# Patient Record
Sex: MALE | Race: BLACK OR AFRICAN AMERICAN | Employment: FULL TIME | URBAN - METROPOLITAN AREA
[De-identification: names, ages, dates, MRNs, and addresses within clinical notes are randomized per-mention and may not be internally consistent; named-entity substitution may affect disease eponyms.]

---

## 2021-10-19 ENCOUNTER — OFFICE VISIT (OUTPATIENT)
Dept: CARDIOLOGY CLINIC | Age: 61
End: 2021-10-19
Payer: COMMERCIAL

## 2021-10-19 VITALS
HEART RATE: 75 BPM | DIASTOLIC BLOOD PRESSURE: 80 MMHG | OXYGEN SATURATION: 96 % | SYSTOLIC BLOOD PRESSURE: 134 MMHG | BODY MASS INDEX: 33.79 KG/M2 | HEIGHT: 70 IN | WEIGHT: 236 LBS

## 2021-10-19 DIAGNOSIS — R07.9 CHEST PAIN, UNSPECIFIED TYPE: Primary | ICD-10-CM

## 2021-10-19 PROCEDURE — 93000 ELECTROCARDIOGRAM COMPLETE: CPT | Performed by: INTERNAL MEDICINE

## 2021-10-19 PROCEDURE — 99204 OFFICE O/P NEW MOD 45 MIN: CPT | Performed by: INTERNAL MEDICINE

## 2021-10-19 RX ORDER — ASPIRIN 81 MG/1
81 TABLET ORAL DAILY
Qty: 30 TABLET | Refills: 5 | Status: SHIPPED | OUTPATIENT
Start: 2021-10-19 | End: 2022-02-28

## 2021-10-19 RX ORDER — HYDROCODONE BITARTRATE AND ACETAMINOPHEN 7.5; 325 MG/1; MG/1
1 TABLET ORAL
COMMUNITY
End: 2022-02-28

## 2021-10-19 RX ORDER — TAMSULOSIN HYDROCHLORIDE 0.4 MG/1
0.4 CAPSULE ORAL DAILY
COMMUNITY
End: 2022-02-28

## 2021-10-19 RX ORDER — FINASTERIDE 5 MG/1
5 TABLET, FILM COATED ORAL DAILY
COMMUNITY
End: 2022-02-21 | Stop reason: SDUPTHER

## 2021-10-19 RX ORDER — SALMETEROL XINAFOATE 50 UG/1
1 POWDER, METERED ORAL; RESPIRATORY (INHALATION)
COMMUNITY
End: 2022-02-28

## 2021-10-19 RX ORDER — ATORVASTATIN CALCIUM 40 MG/1
40 TABLET, FILM COATED ORAL DAILY
COMMUNITY

## 2021-10-19 RX ORDER — MAXZIDE 75; 50 MG/1; MG/1
1 TABLET ORAL DAILY
COMMUNITY
End: 2022-02-28

## 2021-10-19 NOTE — PROGRESS NOTES
HISTORY OF PRESENT ILLNESS  Alexey Teixeira is a 62 y.o. male. ASSESSMENT and PLAN    Mr. Daisy Valentine has no prior documented history of cardiac disease. He does have history of noninsulin-dependent diabetes mellitus, hyperlipidemia, hypertension and obesity. He has no known prior family history of early CAD. He did smoke cigarettes until 1999 but discontinued. He struggles with back pain because of a fall that he abstained while he was in the [employer]. Interestingly, he has been diagnosed with histoplasmosis around 2010. He has issues with left-sided lung parenchymal deficiency. His chest pain is somewhat atypical.  He was thought to have had pericarditis and was started on indomethacin. There is no exertional component. However, his electrocardiogram is abnormal with anterolateral T wave inversion. His blood pressure is well controlled. Rhythm appears to be sinus. There is no evidence of decompensated CHF noted. His weight today is 236 pounds. 4 months ago, his weight was around 225 pounds. He is on Lipitor 40 mg daily. He has not smoked cigarettes since 1999. He is started on baby aspirin daily. For his chest pain, with his abnormal electrocardiogram, I have recommended proceeding with pharmacologic nuclear scan. He is unable to exercise due to back pain. Also, echocardiogram has been requested. If the above testing shows significant abnormalities, I did discuss with him at length about possibility of coronary angiography. He is in agreement. He is scheduled to have stimulator for his back pain. This is scheduled for 11/2/2021. I will see him back in 1 month. Thank you. Encounter Diagnoses   Name Primary?     Chest pain, unspecified type Yes     the following changes in treatment are made: See above  lab results and schedule of future lab studies reviewed with patient  reviewed diet, exercise and weight control  very strongly urged to quit smoking to reduce cardiovascular risk  cardiovascular risk and specific lipid/LDL goals reviewed  use of aspirin to prevent MI and TIA's discussed      HPI   Today, Mr. Cara Nicolas has complaints of mid substernal pains. There is no radiation. There is no associated dyspnea. There is no exertional component. He denies any orthopnea or PND. He does have baseline dyspnea on exertion without change. He denies any palpitations or dizziness. Review of Systems   Respiratory: Positive for shortness of breath. Cardiovascular: Positive for chest pain. Negative for palpitations, orthopnea, claudication, leg swelling and PND. Musculoskeletal: Positive for back pain. All other systems reviewed and are negative. Physical Exam  Vitals and nursing note reviewed. Constitutional:       Appearance: He is obese. HENT:      Head: Normocephalic. Eyes:      Conjunctiva/sclera: Conjunctivae normal.   Neck:      Vascular: No carotid bruit. Cardiovascular:      Rate and Rhythm: Normal rate and regular rhythm. Pulmonary:      Breath sounds: Normal breath sounds. Abdominal:      Palpations: Abdomen is soft. Musculoskeletal:         General: No swelling. Cervical back: No rigidity. Skin:     General: Skin is warm and dry. Neurological:      General: No focal deficit present. Mental Status: He is alert and oriented to person, place, and time. Psychiatric:         Mood and Affect: Mood normal.         Behavior: Behavior normal.         PCP: Unknown, Provider, MD    Past Medical History:   Diagnosis Date    Diabetes ([facility] [address]     Essential hypertension     Histoplasmosis 2010       No past surgical history on file. Current Outpatient Medications   Medication Sig Dispense Refill    triamterene-hydroCHLOROthiazide (Maxzide) 75-50 mg per tablet Take 1 Tablet by mouth daily.  tiotropium (SPIRIVA) 18 mcg inhalation capsule Take 1 Capsule by inhalation.       SITagliptin (Januvia) 100 mg tablet       tamsulosin (Flomax) 0.4 mg capsule Take 0.4 mg by mouth daily.  salmeteroL (Serevent Diskus) 50 mcg/dose dsdv Take 1 Puff by inhalation.  PROPYLHEXEDRINE NA by Nasal route.  HYDROcodone-acetaminophen (Norco) 7.5-325 mg per tablet Take 1 Tablet by mouth.  atorvastatin (LIPITOR) 40 mg tablet Take 40 mg by mouth daily.  finasteride (Proscar) 5 mg tablet Take 5 mg by mouth daily.  glipiZIDE (GLUCOTROL) 10 mg tablet Take  by mouth.  amLODIPine (Norvasc) 10 mg tablet Take 10 mg by mouth daily.  atenoloL (TENORMIN) 50 mg tablet Take 50 mg by mouth daily.  celecoxib (CeleBREX) 400 mg capsule Take 400 mg by mouth.  metFORMIN (GLUCOPHAGE) 500 mg tablet Take 500 mg by mouth two (2) times daily (with meals).  sildenafil citrate (VIAGRA) 100 mg tablet Take 1 Tablet by mouth daily as needed for Erectile Dysfunction. 30 Tablet 3       The patient has a family history of    Social History     Tobacco Use    Smoking status: Never Smoker    Smokeless tobacco: Never Used   Vaping Use    Vaping Use: Never used   Substance Use Topics    Alcohol use: Not Currently    Drug use: Never       No results found for: CHOL, CHOLX, CHLST, CHOLV, HDL, HDLP, LDL, LDLC, DLDLP, TGLX, TRIGL, TRIGP, CHHD, CHHDX     BP Readings from Last 3 Encounters:   10/19/21 134/80        Pulse Readings from Last 3 Encounters:   10/19/21 75       Wt Readings from Last 3 Encounters:   10/19/21 107 kg (236 lb)   08/30/21 99.8 kg (220 lb)         EKG: normal sinus rhythm, lateral T wave inversion in leads I, 2, aVL, V4-V6.

## 2021-10-19 NOTE — PROGRESS NOTES
Randi Guzman presents today for   Chief Complaint   Patient presents with    New Patient     referred by [facility] for chest pain        Randi Guzman preferred language for health care discussion is english/other. Is someone accompanying this pt? no    Is the patient using any DME equipment during [address]? no    Depression Screening:  3 most recent PHQ Screens 10/19/2021   Little interest or pleasure in doing things Not at all   Feeling down, depressed, irritable, or hopeless Not at all   Total Score PHQ 2 0       Learning Assessment:  Learning Assessment 10/19/2021   PRIMARY LEARNER Patient   HIGHEST LEVEL OF EDUCATION - PRIMARY LEARNER  4 YEARS OF COLLEGE   BARRIERS PRIMARY LEARNER NONE   PRIMARY LANGUAGE ENGLISH   LEARNER PREFERENCE PRIMARY DEMONSTRATION   ANSWERED BY patient   RELATIONSHIP SELF       Abuse Screening:  Abuse Screening Questionnaire 10/19/2021   Do you ever feel afraid of your partner? N   Are you in a relationship with someone who physically or mentally threatens you? N   Is it safe for you to go home? Y       Fall Risk  No flowsheet data found. Pt currently taking Anticoagulant therapy? no    Coordination of Care:  1. Have you been to the ER, urgent care clinic since your last visit? Hospitalized since your last visit? no    2. Have you seen or consulted any other health care providers outside of the [address] [place] [address] since your last visit? Include any pap smears or colon screening.  no

## 2021-10-28 ENCOUNTER — HOSPITAL ENCOUNTER (OUTPATIENT)
Dept: NON INVASIVE DIAGNOSTICS | Age: 61
Discharge: HOME | End: 2021-10-28
Attending: INTERNAL MEDICINE
Payer: COMMERCIAL

## 2021-10-28 VITALS
DIASTOLIC BLOOD PRESSURE: 80 MMHG | BODY MASS INDEX: 33.79 KG/M2 | WEIGHT: 236 LBS | SYSTOLIC BLOOD PRESSURE: 134 MMHG | HEIGHT: 70 IN

## 2021-10-28 DIAGNOSIS — R07.9 CHEST PAIN, UNSPECIFIED TYPE: ICD-10-CM

## 2021-10-28 LAB
ECHO AO ROOT DIAM: 3.1 CM
ECHO LA AREA 4C: 18.52 CM2
ECHO LA VOL 2C: 68.53 ML (ref 18–58)
ECHO LA VOL 4C: 49.84 ML (ref 18–58)
ECHO LA VOL BP: 69.01 ML (ref 18–58)
ECHO LA VOL/BSA BIPLANE: 30.81 ML/M2 (ref 16–28)
ECHO LA VOLUME INDEX A2C: 30.59 ML/M2 (ref 16–28)
ECHO LA VOLUME INDEX A4C: 22.25 ML/M2 (ref 16–28)
ECHO LV E' LATERAL VELOCITY: 9.48 CM/S
ECHO LV E' SEPTAL VELOCITY: 7.93 CM/S
ECHO LV INTERNAL DIMENSION DIASTOLIC: 6.18 CM (ref 4.2–5.9)
ECHO LV INTERNAL DIMENSION SYSTOLIC: 4.7 CM
ECHO LV IVSD: 0.83 CM (ref 0.6–1)
ECHO LV MASS 2D: 253 G (ref 88–224)
ECHO LV MASS INDEX 2D: 112.9 G/M2 (ref 49–115)
ECHO LV POSTERIOR WALL DIASTOLIC: 1.13 CM (ref 0.6–1)
ECHO LVOT CARDIAC OUTPUT: 5.75 LITER/MINUTE
ECHO LVOT DIAM: 2.28 CM
ECHO LVOT PEAK GRADIENT: 3.7 MMHG
ECHO LVOT PEAK VELOCITY: 96.17 CM/S
ECHO LVOT SV: 78.8 ML
ECHO LVOT VTI: 19.33 CM
ECHO MV A VELOCITY: 84.12 CM/S
ECHO MV E DECELERATION TIME (DT): 140.13 MS
ECHO MV E VELOCITY: 102.26 CM/S
ECHO MV E/A RATIO: 1.22
ECHO MV E/E' LATERAL: 10.79
ECHO MV E/E' RATIO (AVERAGED): 11.84
ECHO MV E/E' SEPTAL: 12.9
ECHO RV TAPSE: 3.45 CM (ref 1.5–2)
LVOT MG: 1.73 MMHG

## 2021-10-28 PROCEDURE — 93306 TTE W/DOPPLER COMPLETE: CPT

## 2021-10-29 ENCOUNTER — TELEPHONE (OUTPATIENT)
Dept: CARDIOLOGY CLINIC | Age: 61
End: 2021-10-29

## 2021-10-29 NOTE — PROGRESS NOTES
Per your last note\" Mr. Loli Escamilla has no prior documented history of cardiac disease. He does have history of noninsulin-dependent diabetes mellitus, hyperlipidemia, hypertension and obesity. He has no known prior family history of early CAD. He did smoke cigarettes until 1999 but discontinued. He struggles with back pain because of a fall that he abstained while he was in the [employer]. Interestingly, he has been diagnosed with histoplasmosis around 2010. He has issues with left-sided lung parenchymal deficiency. His chest pain is somewhat atypical.  He was thought to have had pericarditis and was started on indomethacin. There is no exertional component. However, his electrocardiogram is abnormal with anterolateral T wave inversion. His blood pressure is well controlled. Rhythm appears to be sinus. There is no evidence of decompensated CHF noted. His weight today is 236 pounds. 4 months ago, his weight was around 225 pounds. He is on Lipitor 40 mg daily. He has not smoked cigarettes since 1999. He is started on baby aspirin daily. For his chest pain, with his abnormal electrocardiogram, I have recommended proceeding with pharmacologic nuclear scan. He is unable to exercise due to back pain. Also, echocardiogram has been requested. If the above testing shows significant abnormalities, I did discuss with him at length about possibility of coronary angiography. He is in agreement. He is scheduled to have stimulator for his back pain.   This is scheduled for 11/2/2021

## 2021-11-04 ENCOUNTER — TELEPHONE (OUTPATIENT)
Dept: CARDIOLOGY CLINIC | Age: 61
End: 2021-11-04

## 2021-11-04 NOTE — TELEPHONE ENCOUNTER
----- Message from [id number] Justo Infante MD sent at 10/30/2021 11:48 AM EDT -----  Let the patient know that his echocardiogram is normal.  ----- Message -----  From: Jani Gonzalez LPN  Sent: 10/79/9986   3:49 PM EDT  To: [id number] Justo Infante MD    Per your last note\" Mr. Anne Marie Sanchez has no prior documented history of cardiac disease. He does have history of noninsulin-dependent diabetes mellitus, hyperlipidemia, hypertension and obesity. He has no known prior family history of early CAD. He did smoke cigarettes until 1999 but discontinued. He struggles with back pain because of a fall that he abstained while he was in the [employer]. Interestingly, he has been diagnosed with histoplasmosis around 2010. He has issues with left-sided lung parenchymal deficiency. His chest pain is somewhat atypical.  He was thought to have had pericarditis and was started on indomethacin. There is no exertional component. However, his electrocardiogram is abnormal with anterolateral T wave inversion. His blood pressure is well controlled. Rhythm appears to be sinus. There is no evidence of decompensated CHF noted. His weight today is 236 pounds. 4 months ago, his weight was around 225 pounds. He is on Lipitor 40 mg daily. He has not smoked cigarettes since 1999. He is started on baby aspirin daily. For his chest pain, with his abnormal electrocardiogram, I have recommended proceeding with pharmacologic nuclear scan. He is unable to exercise due to back pain. Also, echocardiogram has been requested. If the above testing shows significant abnormalities, I did discuss with him at length about possibility of coronary angiography. He is in agreement. He is scheduled to have stimulator for his back pain.   This is scheduled for 11/2/2021

## 2021-11-16 ENCOUNTER — OFFICE VISIT (OUTPATIENT)
Dept: CARDIOLOGY CLINIC | Age: 61
End: 2021-11-16
Payer: COMMERCIAL

## 2021-11-16 ENCOUNTER — TELEPHONE (OUTPATIENT)
Dept: CARDIOLOGY CLINIC | Age: 61
End: 2021-11-16

## 2021-11-16 VITALS
HEIGHT: 70 IN | BODY MASS INDEX: 33.5 KG/M2 | SYSTOLIC BLOOD PRESSURE: 120 MMHG | OXYGEN SATURATION: 96 % | WEIGHT: 234 LBS | DIASTOLIC BLOOD PRESSURE: 72 MMHG | HEART RATE: 65 BPM

## 2021-11-16 DIAGNOSIS — R07.9 CHEST PAIN, UNSPECIFIED TYPE: Primary | ICD-10-CM

## 2021-11-16 PROCEDURE — 93000 ELECTROCARDIOGRAM COMPLETE: CPT | Performed by: INTERNAL MEDICINE

## 2021-11-16 PROCEDURE — 99214 OFFICE O/P EST MOD 30 MIN: CPT | Performed by: INTERNAL MEDICINE

## 2021-11-16 NOTE — TELEPHONE ENCOUNTER
----- Message from [id number] Justo Infante MD sent at 11/9/2021  4:44 PM EST -----  Please let the patient know that the nuclear scan is low risk and unremarkable.  ----- Message -----  From: Ayan SantiagoELENA  Sent: 77/2/8364   4:06 PM EST  To: [id number] Justo Infante MD    Per your last note\"    Mr. Patriciaann Saint has no prior documented history of cardiac disease. He does have history of noninsulin-dependent diabetes mellitus, hyperlipidemia, hypertension and obesity. He has no known prior family history of early CAD. He did smoke cigarettes until 1999 but discontinued. He struggles with back pain because of a fall that he abstained while he was in the [employer]. Interestingly, he has been diagnosed with histoplasmosis around 2010. He has issues with left-sided lung parenchymal deficiency. His chest pain is somewhat atypical.  He was thought to have had pericarditis and was started on indomethacin. There is no exertional component. However, his electrocardiogram is abnormal with anterolateral T wave inversion. His blood pressure is well controlled. Rhythm appears to be sinus. There is no evidence of decompensated CHF noted. His weight today is 236 pounds. 4 months ago, his weight was around 225 pounds. He is on Lipitor 40 mg daily. He has not smoked cigarettes since 1999. He is started on baby aspirin daily. For his chest pain, with his abnormal electrocardiogram, I have recommended proceeding with pharmacologic nuclear scan. He is unable to exercise due to back pain. Also, echocardiogram has been requested. If the above testing shows significant abnormalities, I did discuss with him at length about possibility of coronary angiography. He is in agreement. He is scheduled to have stimulator for his back pain. This is scheduled for 11/2/2021.

## 2021-11-16 NOTE — PROGRESS NOTES
HISTORY OF PRESENT ILLNESS  Luis Sarmiento is a 62 y.o. male. ASSESSMENT and PLAN    Mr. Anne Marie Sanchez has no prior documented history of cardiac disease. He does have history of noninsulin-dependent diabetes mellitus, hyperlipidemia, hypertension and obesity. He has no known prior family history of early CAD. He did smoke cigarettes until 1999 but discontinued. He struggles with back pain because of a fall that he abstained while he was in the [employer]. Interestingly, he has been diagnosed with histoplasmosis around 2010. He has issues with left-sided lung parenchymal deficiency with diminished breath sounds on the left. In October 2021, he had echocardiogram as well as follow-up nuclear scan. Echocardiogram revealed EF 50-55%. No obvious wall motion abnormality was noted. His nuclear scan done on 11/1/2021 showed probably normal perfusion with fixed defect inferiorly but normal function which was interpreted as likely artifact. His calculated EF was noted to be 47%. No significant reversible perfusion defect was noted. · CAD:    He has no documented history of CAD. · BP:    Well controlled. · Rhythm:    Stable sinus. · CHF:    There is no evidence of decompensated CHF noted. · Weight:     His weight today is 237 pounds. This is without significant change from previous. He states that his weight several months ago was 225 pounds. · Cholesterol:   Target LDL <90. Lipitor 40. · Tobacco:    He has not smoked cigarettes since 1999. · Anti-platelet:   Remains on ASA. He is scheduled to undergo spinal stimulator on 11/29/2021. From cardiac standpoint, he should be low risk for perioperative coronary event. I will see him back in 6 month. Thank you. Encounter Diagnoses   Name Primary?     Chest pain, unspecified type Yes     current treatment plan is effective, no change in therapy  lab results and schedule of future lab studies reviewed with patient  reviewed diet, exercise and weight control  cardiovascular risk and specific lipid/LDL goals reviewed  use of aspirin to prevent MI and TIA's discussed      HPI   Today, Mr. Laila Dewitt has no complaints of exertional chest pains. He still has pleuritic left-sided chest pain. It has improved. He cannot lay down without chest pain. Previously, he had severe chest pain when he tried to lay down to go to sleep. He denies any exertional chest discomfort. He denies any changes in his breathing status or exercise capacity. He does have baseline dyspnea on exertion without recent worsening. Review of Systems   Respiratory: Positive for shortness of breath. Cardiovascular: Positive for chest pain. Negative for palpitations, orthopnea, claudication, leg swelling and PND. All other systems reviewed and are negative. Physical Exam  Vitals and nursing note reviewed. Constitutional:       Appearance: Normal appearance. HENT:      Head: Normocephalic. Eyes:      Conjunctiva/sclera: Conjunctivae normal.   Neck:      Vascular: No carotid bruit. Cardiovascular:      Rate and Rhythm: Normal rate and regular rhythm. Pulmonary:      Breath sounds: Decreased air movement present. Examination of the left-upper field reveals decreased breath sounds. Examination of the left-middle field reveals decreased breath sounds. Examination of the left-lower field reveals decreased breath sounds. Decreased breath sounds present. Abdominal:      Palpations: Abdomen is soft. Musculoskeletal:         General: No swelling. Cervical back: No rigidity. Skin:     General: Skin is warm and dry. Neurological:      General: No focal deficit present. Mental Status: He is alert and oriented to person, place, and time.    Psychiatric:         Mood and Affect: Mood normal.         Behavior: Behavior normal.         PCP: Rodri Lombardo MD    Past Medical History:   Diagnosis Date    Diabetes [facility])     Essential hypertension     Histoplasmosis 2010       No past surgical history on file. Current Outpatient Medications   Medication Sig Dispense Refill    triamterene-hydroCHLOROthiazide (Maxzide) 75-50 mg per tablet Take 1 Tablet by mouth daily.  tiotropium (SPIRIVA) 18 mcg inhalation capsule Take 1 Capsule by inhalation.  SITagliptin (Januvia) 100 mg tablet       tamsulosin (Flomax) 0.4 mg capsule Take 0.4 mg by mouth daily.  salmeteroL (Serevent Diskus) 50 mcg/dose dsdv Take 1 Puff by inhalation.  PROPYLHEXEDRINE NA by Nasal route.  HYDROcodone-acetaminophen (Norco) 7.5-325 mg per tablet Take 1 Tablet by mouth.  atorvastatin (LIPITOR) 40 mg tablet Take 40 mg by mouth daily.  finasteride (Proscar) 5 mg tablet Take 5 mg by mouth daily.  aspirin delayed-release 81 mg tablet Take 1 Tablet by mouth daily. 30 Tablet 5    glipiZIDE (GLUCOTROL) 10 mg tablet Take  by mouth.  amLODIPine (Norvasc) 10 mg tablet Take 10 mg by mouth daily.  atenoloL (TENORMIN) 50 mg tablet Take 50 mg by mouth daily.  celecoxib (CeleBREX) 400 mg capsule Take 400 mg by mouth.  metFORMIN (GLUCOPHAGE) 500 mg tablet Take 500 mg by mouth two (2) times daily (with meals).  sildenafil citrate (VIAGRA) 100 mg tablet Take 1 Tablet by mouth daily as needed for Erectile Dysfunction.  30 Tablet 3       The patient has a family history of    Social History     Tobacco Use    Smoking status: Never Smoker    Smokeless tobacco: Never Used   Vaping Use    Vaping Use: Never used   Substance Use Topics    Alcohol use: Not Currently    Drug use: Never       No results found for: CHOL, CHOLX, CHLST, CHOLV, HDL, HDLP, LDL, LDLC, DLDLP, TGLX, TRIGL, TRIGP, CHHD, CHHDX     BP Readings from Last 3 Encounters:   11/16/21 120/72   10/28/21 134/80   10/19/21 134/80        Pulse Readings from Last 3 Encounters:   11/16/21 65   10/19/21 75       Wt Readings from Last 3 Encounters:   11/16/21 106.1 kg (234 lb)   10/28/21 107 kg (236 lb) 10/19/21 107 kg (236 lb)         EKG: unchanged from previous tracings, normal sinus rhythm, Q waves in lead V1.

## 2021-11-16 NOTE — PROGRESS NOTES
Dalia Hebert presents today for   Chief Complaint   Patient presents with    Follow-up     1 month follow up     Chest Pain     same discomfort feeling        Dalia Hebert preferred language for health care discussion is english/other. Is someone accompanying this pt? no    Is the patient using any DME equipment during [address]? no    Depression Screening:  3 most recent PHQ Screens 10/19/2021   Little interest or pleasure in doing things Not at all   Feeling down, depressed, irritable, or hopeless Not at all   Total Score PHQ 2 0       Learning Assessment:  Learning Assessment 10/19/2021   PRIMARY LEARNER Patient   HIGHEST LEVEL OF EDUCATION - PRIMARY LEARNER  4 YEARS OF COLLEGE   BARRIERS PRIMARY LEARNER NONE   PRIMARY LANGUAGE ENGLISH   LEARNER PREFERENCE PRIMARY DEMONSTRATION   ANSWERED BY patient   RELATIONSHIP SELF       Abuse Screening:  Abuse Screening Questionnaire 10/19/2021   Do you ever feel afraid of your partner? N   Are you in a relationship with someone who physically or mentally threatens you? N   Is it safe for you to go home? Y       Fall Risk  No flowsheet data found. Pt currently taking Anticoagulant therapy? no    Coordination of Care:  1. Have you been to the ER, urgent care clinic since your last visit? Hospitalized since your last visit? no    2. Have you seen or consulted any other health care providers outside of the [address] [place] [address] since your last visit? Include any pap smears or colon screening.  no

## 2021-11-18 PROBLEM — G89.4 CHRONIC PAIN SYNDROME: Status: ACTIVE | Noted: 2021-11-18

## 2021-11-18 PROBLEM — G89.29 CHRONIC BACK PAIN: Status: ACTIVE | Noted: 2021-11-18

## 2021-11-18 PROBLEM — M54.9 CHRONIC BACK PAIN: Status: ACTIVE | Noted: 2021-11-18

## 2021-11-18 NOTE — H&P
History and Physical        Patient: Marcela Adams               Sex: male          DOA: (Not on file)         YOB: 1960      Age:  62 y.o.        LOS:  LOS: 0 days        HPI:     Marcela Adams is a 62 y.o. male who has a history of back pain. Their pain is typically across the lower back and is chronic in nature. He denies numbness/tingling in the same distribution of their pain. He denies weakness in the bilateral lower extremity. Their pain is worse with ambulation and better at rest. He has failed conservative care including anti-inflammatories, analgesics, physical therapy and injections. Their pain affects their activities of daily living and would like to move forward with surgical intervention. Past Medical History:   Diagnosis Date    Diabetes ([facility] [address]     Essential hypertension     Histoplasmosis 2010       No past surgical history on file. Family History   Problem Relation Age of Onset    Diabetes Paternal Grandmother        Social History     Socioeconomic History    Marital status: SINGLE   Tobacco Use    Smoking status: Never Smoker    Smokeless tobacco: Never Used   Vaping Use    Vaping Use: Never used   Substance and Sexual Activity    Alcohol use: Not Currently    Drug use: Never    Sexual activity: Yes       Prior to Admission medications    Medication Sig Start Date End Date Taking? Authorizing Provider   triamterene-hydroCHLOROthiazide (Maxzide) 75-50 mg per tablet Take 1 Tablet by mouth daily. Provider, Historical   tiotropium (SPIRIVA) 18 mcg inhalation capsule Take 1 Capsule by inhalation. Provider, Historical   SITagliptin (Januvia) 100 mg tablet  5/2/21   Provider, Historical   tamsulosin (Flomax) 0.4 mg capsule Take 0.4 mg by mouth daily. Provider, Historical   salmeteroL (Serevent Diskus) 50 mcg/dose dsdv Take 1 Puff by inhalation. Provider, Historical   PROPYLHEXEDRINE NA by Nasal route.     Provider, Historical HYDROcodone-acetaminophen (Norco) 7.5-325 mg per tablet Take 1 Tablet by mouth. Provider, Historical   atorvastatin (LIPITOR) 40 mg tablet Take 40 mg by mouth daily. Provider, Historical   finasteride (Proscar) 5 mg tablet Take 5 mg by mouth daily. Provider, Historical   aspirin delayed-release 81 mg tablet Take 1 Tablet by mouth daily. 10/19/21   Brandon Hart MD   glipiZIDE (GLUCOTROL) 10 mg tablet Take  by mouth. Provider, Historical   amLODIPine (Norvasc) 10 mg tablet Take 10 mg by mouth daily. Provider, Historical   atenoloL (TENORMIN) 50 mg tablet Take 50 mg by mouth daily. Provider, Historical   celecoxib (CeleBREX) 400 mg capsule Take 400 mg by mouth. Provider, Historical   metFORMIN (GLUCOPHAGE) 500 mg tablet Take 500 mg by mouth two (2) times daily (with meals). Provider, Historical   sildenafil citrate (VIAGRA) 100 mg tablet Take 1 Tablet by mouth daily as needed for Erectile Dysfunction. 8/30/21   DERECK Chapman       Allergies   Allergen Reactions    Venom-Honey Bee Swelling       Review of Systems  A comprehensive review of systems was negative except for that written in the History of Present Illness. Physical Exam:      There were no vitals taken for this visit. Physical Exam:  General: Alert and Oriented X 3  Lungs: Clear to ausculation bilaterally  Cardiovascular: Regular Rate and Rhythm, without murmur  Abdomen: Soft, nontender with positive bowel sounds in all four quadrants  Gential/Rectal: deferred  Musculoskeletal: The paitent has full range of motion of the lumbar spine in flexion, extension and side bending bilaterally. The patient has pain with flexion or extension. There is not pain with internal and external rotation of the bilaterally hip. The patient is tender across the left paralumbar, right paralumbar muscles. The patient is neurologically intact in the lower extremities. There is a Negative straight leg raise. His pulses are 2+.   Calves are nontender. Labs Reviewed: All lab results for the last 24 hours reviewed. Assessment/Plan     Principal Problem:    Chronic pain syndrome (11/18/2021)    Active Problems:    Chronic back pain (11/18/2021)        At this time, we discussed that we will move forward with permanent placement of a spinal cord stimulator, as she responded so well to the trial. We discussed that we would move forward with a T9 Laminotomy with placement of the paddle in the T7-8 disc space centrally. We will place the battery in the gluteal area. The risks versus the benefits as well as the alternatives were fully explained to the patient.

## 2021-11-18 NOTE — DISCHARGE INSTRUCTIONS
[facility]  Dr. Hill Him Instructions for Spinal Cord Stimulator placement    Diet:  1. Begin with liquids and light foods such as Jell-O and soups. 2. Advance as tolerated to your regular diet if not nauseated. First 24 hours:  1. Be in the care of a responsible adult. 2. Do not drive or operate machinery. 3. Do not drink alcoholic beverages. Wound Care:  1. Maintain your postoperative dressing for 72 hours then remove pad and replace with gauze. Then change dressing daily. 2.  A home health nurse will come to your house to help you with dressing changes. 2. Keep the surgical incisions dry until follow-up. Medications:  1. Strong oral narcotic pain medications have been prescribed for the first few days. Use only as directed. No pain medication is capable of taking away all the pain. Taking your pills at regular intervals will give you the best chance of having less pain. 2. If you need a refill PLEASE PLAN AHEAD. Call our office during regular hours (8-5). 3. Do not combine with alcoholic beverages. 4. Be careful as you walk, climb stairs or drive as mild dizziness is not unusual.  5. Do not take medications that have not been prescribed by your surgeon. 6. You may switch to over the counter pain medication of your choice as you become more comfortable. WHEN TO CALL YOUR SURGEON:  1. Unrelenting pain  2. Fever or Chills  3. Redness around incisions  4. Color change in foot or toes  5. Continuous drainage or bleeding from wounds (a small amount of drainage is expected)  6. Any other worrisome condition    WHEN TO CALL YOUR REGULAR DOCTOR:  1. Flare up of any of your regular medical conditions    WHEN TO CALL 911:  1. Chest Pain  2. Shortness of Breath  3. Any other acute serious condition    CALL THE OFFICE:  If you have severe pain unrelieved by the medications; If you have a fever of 101.0°F or greater;    If you notice excessive swelling, redness, or persistent drainage from the incision or IV site; The [facility] office number is [phone] from 8:00am to 5:00pm Monday through Friday. After 5:00pm, on weekends, or holidays, please leave a message with our answering service and the doctor on-call will get back to you shortly.

## 2021-11-22 ENCOUNTER — HOSPITAL ENCOUNTER (OUTPATIENT)
Dept: PREADMISSION TESTING | Age: 61
Discharge: HOME | End: 2021-11-22

## 2021-11-22 ENCOUNTER — HOSPITAL ENCOUNTER (OUTPATIENT)
Dept: PREADMISSION TESTING | Age: 61
Discharge: HOME | End: 2021-11-22
Payer: COMMERCIAL

## 2021-11-22 VITALS — BODY MASS INDEX: 32.2 KG/M2 | HEIGHT: 71 IN | WEIGHT: 230 LBS

## 2021-11-22 LAB
ALBUMIN SERPL-MCNC: 3.9 G/DL (ref 3.4–5)
ALBUMIN/GLOB SERPL: 1.3 {RATIO} (ref 0.8–1.7)
ALP SERPL-CCNC: 70 U/L (ref 45–117)
ALT SERPL-CCNC: 28 U/L (ref 16–61)
ANION GAP SERPL CALC-SCNC: 6 MMOL/L (ref 3–18)
APTT PPP: 29.8 SEC (ref 23–36.4)
AST SERPL-CCNC: 11 U/L (ref 10–38)
BILIRUB SERPL-MCNC: 0.7 MG/DL (ref 0.2–1)
BUN SERPL-MCNC: 19 MG/DL (ref 7–18)
BUN/CREAT SERPL: 12 (ref 12–20)
CALCIUM SERPL-MCNC: 9.6 MG/DL (ref 8.5–10.1)
CHLORIDE SERPL-SCNC: 104 MMOL/L (ref 100–111)
CO2 SERPL-SCNC: 28 MMOL/L (ref 21–32)
CREAT SERPL-MCNC: 1.53 MG/DL (ref 0.6–1.3)
ERYTHROCYTE [DISTWIDTH] IN BLOOD BY AUTOMATED COUNT: 11.7 % (ref 11.6–14.5)
EST. AVERAGE GLUCOSE BLD GHB EST-MCNC: 186 MG/DL
GLOBULIN SER CALC-MCNC: 2.9 G/DL (ref 2–4)
GLUCOSE SERPL-MCNC: 295 MG/DL (ref 74–99)
HBA1C MFR BLD: 8.1 % (ref 4.2–5.6)
HCT VFR BLD AUTO: 40.2 % (ref 36–48)
HGB BLD-MCNC: 13.2 G/DL (ref 13–16)
INR PPP: 1 (ref 0.8–1.2)
MCH RBC QN AUTO: 28.8 PG (ref 24–34)
MCHC RBC AUTO-ENTMCNC: 32.8 G/DL (ref 31–37)
MCV RBC AUTO: 87.6 FL (ref 78–100)
NRBC # BLD: 0 K/UL (ref 0–0.01)
NRBC BLD-RTO: 0 PER 100 WBC
PLATELET # BLD AUTO: 149 K/UL (ref 135–420)
PMV BLD AUTO: 12.4 FL (ref 9.2–11.8)
POTASSIUM SERPL-SCNC: 5.2 MMOL/L (ref 3.5–5.5)
PROT SERPL-MCNC: 6.8 G/DL (ref 6.4–8.2)
PROTHROMBIN TIME: 12.4 SEC (ref 11.5–15.2)
RBC # BLD AUTO: 4.59 M/UL (ref 4.35–5.65)
SODIUM SERPL-SCNC: 138 MMOL/L (ref 136–145)
WBC # BLD AUTO: 5.1 K/UL (ref 4.6–13.2)

## 2021-11-22 PROCEDURE — 85730 THROMBOPLASTIN TIME PARTIAL: CPT

## 2021-11-22 PROCEDURE — 83036 HEMOGLOBIN GLYCOSYLATED A1C: CPT

## 2021-11-22 PROCEDURE — 85610 PROTHROMBIN TIME: CPT

## 2021-11-22 PROCEDURE — 80053 COMPREHEN METABOLIC PANEL: CPT

## 2021-11-22 PROCEDURE — 36415 COLL VENOUS BLD VENIPUNCTURE: CPT

## 2021-11-22 PROCEDURE — 85027 COMPLETE CBC AUTOMATED: CPT

## 2021-11-22 RX ORDER — SODIUM CHLORIDE, SODIUM LACTATE, POTASSIUM CHLORIDE, CALCIUM CHLORIDE 600; 310; 30; 20 MG/100ML; MG/100ML; MG/100ML; MG/100ML
125 INJECTION, SOLUTION INTRAVENOUS CONTINUOUS
Status: CANCELLED | OUTPATIENT
Start: 2021-11-22

## 2021-11-22 RX ORDER — CEFAZOLIN SODIUM/WATER 2 G/20 ML
2 SYRINGE (ML) INTRAVENOUS ONCE
Status: CANCELLED | OUTPATIENT
Start: 2021-11-22 | End: 2021-11-22

## 2021-11-22 NOTE — PERIOP NOTES
PAT phone interview completed. Patient made aware to be NPO. Patient stated he had two doses of COVID vaccine. Patient stated he will come 11/22for labs and EKG and will bring proof of COVID vaccine. Patient made aware not to wear jewelry, lotion, oil or spray on DOS. Patient stated he has a ride for discharge and someone to stay with him for 24 hours after procedure. Patient stated he does not have a DNR order.

## 2021-11-23 LAB
BACTERIA SPEC CULT: NORMAL
BACTERIA SPEC CULT: NORMAL
SERVICE CMNT-IMP: NORMAL

## 2021-11-24 LAB
FAX TO INFO,FAXT: NORMAL
FAX TO NUMBER,FAXN: NORMAL

## 2021-11-26 RX ORDER — INSULIN LISPRO 100 [IU]/ML
INJECTION, SOLUTION INTRAVENOUS; SUBCUTANEOUS ONCE
Status: CANCELLED | OUTPATIENT
Start: 2021-11-26 | End: 2021-11-27

## 2021-11-26 RX ORDER — DEXTROSE 50 % IN WATER (D50W) INTRAVENOUS SYRINGE
25-50 AS NEEDED
Status: CANCELLED | OUTPATIENT
Start: 2021-11-26

## 2021-11-26 RX ORDER — SODIUM CHLORIDE 0.9 % (FLUSH) 0.9 %
5-40 SYRINGE (ML) INJECTION AS NEEDED
Status: CANCELLED | OUTPATIENT
Start: 2021-11-26

## 2021-11-26 RX ORDER — PROCHLORPERAZINE EDISYLATE 5 MG/ML
5 INJECTION INTRAMUSCULAR; INTRAVENOUS ONCE
Status: CANCELLED | OUTPATIENT
Start: 2021-11-26 | End: 2021-11-26

## 2021-11-26 RX ORDER — NALOXONE HYDROCHLORIDE 0.4 MG/ML
0.1 INJECTION, SOLUTION INTRAMUSCULAR; INTRAVENOUS; SUBCUTANEOUS AS NEEDED
Status: CANCELLED | OUTPATIENT
Start: 2021-11-26

## 2021-11-26 RX ORDER — FLUMAZENIL 0.1 MG/ML
0.2 INJECTION INTRAVENOUS
Status: CANCELLED | OUTPATIENT
Start: 2021-11-26

## 2021-11-26 RX ORDER — FENTANYL CITRATE 50 UG/ML
50 INJECTION, SOLUTION INTRAMUSCULAR; INTRAVENOUS AS NEEDED
Status: CANCELLED | OUTPATIENT
Start: 2021-11-26 | End: 2021-11-30

## 2021-11-26 RX ORDER — MAGNESIUM SULFATE 100 %
4 CRYSTALS MISCELLANEOUS AS NEEDED
Status: CANCELLED | OUTPATIENT
Start: 2021-11-26

## 2021-11-26 RX ORDER — SODIUM CHLORIDE 0.9 % (FLUSH) 0.9 %
5-40 SYRINGE (ML) INJECTION EVERY 8 HOURS
Status: CANCELLED | OUTPATIENT
Start: 2021-11-26

## 2021-11-26 RX ORDER — HYDROMORPHONE HYDROCHLORIDE 1 MG/ML
0.5 INJECTION, SOLUTION INTRAMUSCULAR; INTRAVENOUS; SUBCUTANEOUS
Status: CANCELLED | OUTPATIENT
Start: 2021-11-26

## 2021-11-29 ENCOUNTER — HOSPITAL ENCOUNTER (OUTPATIENT)
Age: 61
Setting detail: OUTPATIENT SURGERY
Discharge: HOME | End: 2021-11-29
Attending: ORTHOPAEDIC SURGERY | Admitting: ORTHOPAEDIC SURGERY
Payer: COMMERCIAL

## 2021-11-29 VITALS
DIASTOLIC BLOOD PRESSURE: 75 MMHG | HEART RATE: 60 BPM | HEIGHT: 71 IN | SYSTOLIC BLOOD PRESSURE: 136 MMHG | RESPIRATION RATE: 16 BRPM | WEIGHT: 228.1 LBS | TEMPERATURE: 97.3 F | OXYGEN SATURATION: 95 % | BODY MASS INDEX: 31.93 KG/M2

## 2021-11-29 LAB
GLUCOSE BLD STRIP.AUTO-MCNC: 347 MG/DL (ref 70–110)
GLUCOSE BLD STRIP.AUTO-MCNC: 428 MG/DL (ref 70–110)
GLUCOSE BLD STRIP.AUTO-MCNC: 457 MG/DL (ref 70–110)

## 2021-11-29 PROCEDURE — 74011250637 HC RX REV CODE- 250/637: Performed by: ANESTHESIOLOGY

## 2021-11-29 PROCEDURE — 74011636637 HC RX REV CODE- 636/637: Performed by: ANESTHESIOLOGY

## 2021-11-29 PROCEDURE — 82962 GLUCOSE BLOOD TEST: CPT

## 2021-11-29 PROCEDURE — 74011250636 HC RX REV CODE- 250/636: Performed by: ORTHOPAEDIC SURGERY

## 2021-11-29 RX ORDER — SODIUM CHLORIDE, SODIUM LACTATE, POTASSIUM CHLORIDE, CALCIUM CHLORIDE 600; 310; 30; 20 MG/100ML; MG/100ML; MG/100ML; MG/100ML
125 INJECTION, SOLUTION INTRAVENOUS CONTINUOUS
Status: DISCONTINUED | OUTPATIENT
Start: 2021-11-29 | End: 2021-11-29 | Stop reason: HOSPADM

## 2021-11-29 RX ORDER — CEFAZOLIN SODIUM/WATER 2 G/20 ML
2 SYRINGE (ML) INTRAVENOUS ONCE
Status: DISCONTINUED | OUTPATIENT
Start: 2021-11-29 | End: 2021-11-29 | Stop reason: HOSPADM

## 2021-11-29 RX ORDER — ACETAMINOPHEN 500 MG
1000 TABLET ORAL ONCE
Status: COMPLETED | OUTPATIENT
Start: 2021-11-29 | End: 2021-11-29

## 2021-11-29 RX ORDER — INSULIN LISPRO 100 [IU]/ML
12 INJECTION, SOLUTION INTRAVENOUS; SUBCUTANEOUS ONCE
Status: COMPLETED | OUTPATIENT
Start: 2021-11-29 | End: 2021-11-29

## 2021-11-29 RX ADMIN — SODIUM CHLORIDE, POTASSIUM CHLORIDE, SODIUM LACTATE AND CALCIUM CHLORIDE 125 ML/HR: 600; 310; 30; 20 INJECTION, SOLUTION INTRAVENOUS at 09:32

## 2021-11-29 RX ADMIN — ACETAMINOPHEN 1000 MG: 500 TABLET ORAL at 09:32

## 2021-11-29 RX ADMIN — INSULIN LISPRO 12 UNITS: 100 INJECTION, SOLUTION INTRAVENOUS; SUBCUTANEOUS at 10:03

## 2021-11-29 NOTE — PERIOP NOTES
Reviewed PTA medication list with patient/caregiver and patient/caregiver denies any additional medications. Patient admits to having a responsible adult care for them at home for at least 24 hours after surgery. Patient encouraged to use gown warming system and informed that using said warming gown to regulate body temperature prior to a procedure has been shown to help reduce the risks of blood clots and infection. Patient's pharmacy of choice verified and documented in PTA medication section. Dual skin assessment & fall risk band verification completed with Malik Ghotra RN.

## 2021-11-29 NOTE — PROGRESS NOTES
Pt reports good serum glucose control until last week, when he ran out of glucophage. Will treat his elevated serum glucose right now and send him to PCP for renewal of his glucophage prescription as well evaluation and possible modification of diabetes management.

## 2021-11-29 NOTE — PERIOP NOTES
Dr Jacqueline Devries notified of blood glucose recheck of 347. Patient instructed per Dr Jacqueline Devries to go immediately to PCP and get prescription refilled and get diabetes management reevaluated or go to the ER. Patient verbalized understanding. Patient instructed to call office and reschedule once blood sugar is under control.

## 2021-11-29 NOTE — PROGRESS NOTES
Sugars 458 after stopping one of his medications and not informing us or his PCP. He will obtain his Rxn and resume this med until sugars are again stable and we will reschedule at that time.

## 2022-02-18 ENCOUNTER — HOSPITAL ENCOUNTER (OUTPATIENT)
Dept: PREADMISSION TESTING | Age: 62
Discharge: HOME | End: 2022-02-18
Payer: COMMERCIAL

## 2022-02-18 ENCOUNTER — TRANSCRIBE ORDER (OUTPATIENT)
Dept: REGISTRATION | Age: 62
End: 2022-02-18

## 2022-02-18 DIAGNOSIS — G89.4 CHRONIC PAIN SYNDROME: ICD-10-CM

## 2022-02-18 DIAGNOSIS — G89.4 CHRONIC PAIN SYNDROME: Primary | ICD-10-CM

## 2022-02-18 LAB
APTT PPP: 29.6 SEC (ref 23–36.4)
ATRIAL RATE: 49 BPM
CALCULATED P AXIS, ECG09: 51 DEGREES
CALCULATED R AXIS, ECG10: 7 DEGREES
CALCULATED T AXIS, ECG11: 72 DEGREES
DIAGNOSIS, 93000: NORMAL
EST. AVERAGE GLUCOSE BLD GHB EST-MCNC: 128 MG/DL
HBA1C MFR BLD: 6.1 % (ref 4.2–5.6)
INR PPP: 1 (ref 0.8–1.2)
P-R INTERVAL, ECG05: 188 MS
PROTHROMBIN TIME: 13 SEC (ref 11.5–15.2)
Q-T INTERVAL, ECG07: 426 MS
QRS DURATION, ECG06: 74 MS
QTC CALCULATION (BEZET), ECG08: 384 MS
VENTRICULAR RATE, ECG03: 49 BPM

## 2022-02-18 PROCEDURE — 83036 HEMOGLOBIN GLYCOSYLATED A1C: CPT

## 2022-02-18 PROCEDURE — 85610 PROTHROMBIN TIME: CPT

## 2022-02-18 PROCEDURE — 85730 THROMBOPLASTIN TIME PARTIAL: CPT

## 2022-02-18 PROCEDURE — 93005 ELECTROCARDIOGRAM TRACING: CPT

## 2022-02-18 PROCEDURE — 36415 COLL VENOUS BLD VENIPUNCTURE: CPT

## 2022-02-20 LAB
BACTERIA SPEC CULT: NORMAL
BACTERIA SPEC CULT: NORMAL
SERVICE CMNT-IMP: NORMAL

## 2022-02-25 ENCOUNTER — HOSPITAL ENCOUNTER (OUTPATIENT)
Dept: PREADMISSION TESTING | Age: 62
Discharge: HOME | End: 2022-02-25
Payer: COMMERCIAL

## 2022-02-25 PROCEDURE — U0003 INFECTIOUS AGENT DETECTION BY NUCLEIC ACID (DNA OR RNA); SEVERE ACUTE RESPIRATORY SYNDROME CORONAVIRUS 2 (SARS-COV-2) (CORONAVIRUS DISEASE [COVID-19]), AMPLIFIED PROBE TECHNIQUE, MAKING USE OF HIGH THROUGHPUT TECHNOLOGIES AS DESCRIBED BY CMS-2020-01-R: HCPCS

## 2022-02-26 LAB — SARS-COV-2, COV2NT: NOT DETECTED

## 2022-02-28 ENCOUNTER — HOSPITAL ENCOUNTER (OUTPATIENT)
Dept: PREADMISSION TESTING | Age: 62
Discharge: HOME | End: 2022-02-28

## 2022-02-28 VITALS — HEIGHT: 70 IN | BODY MASS INDEX: 32.93 KG/M2 | WEIGHT: 230 LBS

## 2022-02-28 RX ORDER — CEFAZOLIN SODIUM/WATER 2 G/20 ML
2 SYRINGE (ML) INTRAVENOUS ONCE
Status: CANCELLED | OUTPATIENT
Start: 2022-02-28 | End: 2022-02-28

## 2022-02-28 RX ORDER — CELECOXIB 200 MG/1
200 CAPSULE ORAL DAILY
COMMUNITY
End: 2022-03-03

## 2022-02-28 RX ORDER — GLIPIZIDE 10 MG/1
10 TABLET, FILM COATED, EXTENDED RELEASE ORAL DAILY
COMMUNITY

## 2022-02-28 RX ORDER — SODIUM CHLORIDE, SODIUM LACTATE, POTASSIUM CHLORIDE, CALCIUM CHLORIDE 600; 310; 30; 20 MG/100ML; MG/100ML; MG/100ML; MG/100ML
125 INJECTION, SOLUTION INTRAVENOUS CONTINUOUS
Status: CANCELLED | OUTPATIENT
Start: 2022-02-28

## 2022-02-28 NOTE — PERIOP NOTES
Operative consent filled out according to MD order on surgical posting, verbatim. Patient had Covid test on 02/25/2022. Patient instructed as part of pre-op teaching not to bring any valuables including Wallet, jewelry, cell phone, lap top or tablet. Patient also instructed not to wear anything on skin including deodorant, cologne, creams or sprays. Patient confirmed receipt of CHG skin preparation and instructions. Patient is aware of one visitor policy in surgical pavilion.

## 2022-03-01 ENCOUNTER — HOSPITAL ENCOUNTER (OUTPATIENT)
Dept: PREADMISSION TESTING | Age: 62
Discharge: HOME | End: 2022-03-01
Payer: COMMERCIAL

## 2022-03-01 LAB
ALBUMIN SERPL-MCNC: 3.8 G/DL (ref 3.4–5)
ALBUMIN/GLOB SERPL: 1.2 {RATIO} (ref 0.8–1.7)
ALP SERPL-CCNC: 56 U/L (ref 45–117)
ALT SERPL-CCNC: 30 U/L (ref 16–61)
ANION GAP SERPL CALC-SCNC: 5 MMOL/L (ref 3–18)
AST SERPL-CCNC: 18 U/L (ref 10–38)
BILIRUB SERPL-MCNC: 0.7 MG/DL (ref 0.2–1)
BUN SERPL-MCNC: 23 MG/DL (ref 7–18)
BUN/CREAT SERPL: 14 (ref 12–20)
CALCIUM SERPL-MCNC: 9.5 MG/DL (ref 8.5–10.1)
CHLORIDE SERPL-SCNC: 106 MMOL/L (ref 100–111)
CO2 SERPL-SCNC: 29 MMOL/L (ref 21–32)
CREAT SERPL-MCNC: 1.63 MG/DL (ref 0.6–1.3)
ERYTHROCYTE [DISTWIDTH] IN BLOOD BY AUTOMATED COUNT: 11.4 % (ref 11.6–14.5)
GLOBULIN SER CALC-MCNC: 3.1 G/DL (ref 2–4)
GLUCOSE SERPL-MCNC: 220 MG/DL (ref 74–99)
HCT VFR BLD AUTO: 38.8 % (ref 36–48)
HGB BLD-MCNC: 12.5 G/DL (ref 13–16)
MCH RBC QN AUTO: 28.5 PG (ref 24–34)
MCHC RBC AUTO-ENTMCNC: 32.2 G/DL (ref 31–37)
MCV RBC AUTO: 88.4 FL (ref 78–100)
NRBC # BLD: 0 K/UL (ref 0–0.01)
NRBC BLD-RTO: 0 PER 100 WBC
PLATELET # BLD AUTO: 182 K/UL (ref 135–420)
PMV BLD AUTO: 11.8 FL (ref 9.2–11.8)
POTASSIUM SERPL-SCNC: 4.3 MMOL/L (ref 3.5–5.5)
PROT SERPL-MCNC: 6.9 G/DL (ref 6.4–8.2)
RBC # BLD AUTO: 4.39 M/UL (ref 4.35–5.65)
SODIUM SERPL-SCNC: 140 MMOL/L (ref 136–145)
WBC # BLD AUTO: 4.9 K/UL (ref 4.6–13.2)

## 2022-03-01 PROCEDURE — 80053 COMPREHEN METABOLIC PANEL: CPT

## 2022-03-01 PROCEDURE — 36415 COLL VENOUS BLD VENIPUNCTURE: CPT

## 2022-03-01 PROCEDURE — 85027 COMPLETE CBC AUTOMATED: CPT

## 2022-03-02 ENCOUNTER — ANESTHESIA EVENT (OUTPATIENT)
Dept: SURGERY | Age: 62
End: 2022-03-02
Payer: COMMERCIAL

## 2022-03-03 ENCOUNTER — APPOINTMENT (OUTPATIENT)
Dept: GENERAL RADIOLOGY | Age: 62
End: 2022-03-03
Attending: ORTHOPAEDIC SURGERY
Payer: COMMERCIAL

## 2022-03-03 ENCOUNTER — HOSPITAL ENCOUNTER (OUTPATIENT)
Age: 62
Setting detail: OUTPATIENT SURGERY
Discharge: HOME | End: 2022-03-03
Attending: ORTHOPAEDIC SURGERY | Admitting: ORTHOPAEDIC SURGERY
Payer: COMMERCIAL

## 2022-03-03 ENCOUNTER — ANESTHESIA (OUTPATIENT)
Dept: SURGERY | Age: 62
End: 2022-03-03
Payer: COMMERCIAL

## 2022-03-03 VITALS
HEART RATE: 57 BPM | WEIGHT: 227.3 LBS | DIASTOLIC BLOOD PRESSURE: 62 MMHG | TEMPERATURE: 96.6 F | OXYGEN SATURATION: 92 % | SYSTOLIC BLOOD PRESSURE: 132 MMHG | BODY MASS INDEX: 31.82 KG/M2 | HEIGHT: 71 IN | RESPIRATION RATE: 14 BRPM

## 2022-03-03 DIAGNOSIS — G89.4 CHRONIC PAIN SYNDROME: Primary | ICD-10-CM

## 2022-03-03 LAB
GLUCOSE BLD STRIP.AUTO-MCNC: 142 MG/DL (ref 70–110)
GLUCOSE BLD STRIP.AUTO-MCNC: 188 MG/DL (ref 70–110)
GLUCOSE BLD STRIP.AUTO-MCNC: 189 MG/DL (ref 70–110)

## 2022-03-03 PROCEDURE — C1820 GENERATOR NEURO RECHG BAT SY: HCPCS | Performed by: ORTHOPAEDIC SURGERY

## 2022-03-03 PROCEDURE — 77030014076 HC TOOL TUNNEL BSC -C: Performed by: ORTHOPAEDIC SURGERY

## 2022-03-03 PROCEDURE — 77030006643: Performed by: ANESTHESIOLOGY

## 2022-03-03 PROCEDURE — 77030020782 HC GWN BAIR PAWS FLX 3M -B: Performed by: ORTHOPAEDIC SURGERY

## 2022-03-03 PROCEDURE — 77030003029 HC SUT VCRL J&J -B: Performed by: ORTHOPAEDIC SURGERY

## 2022-03-03 PROCEDURE — 77030037343 HC KT REMOT CTRL FRELNK MRI BSC -G: Performed by: ORTHOPAEDIC SURGERY

## 2022-03-03 PROCEDURE — 74011000250 HC RX REV CODE- 250: Performed by: NURSE ANESTHETIST, CERTIFIED REGISTERED

## 2022-03-03 PROCEDURE — 77030008683 HC TU ET CUF COVD -A: Performed by: ANESTHESIOLOGY

## 2022-03-03 PROCEDURE — 76010000153 HC OR TIME 1.5 TO 2 HR: Performed by: ORTHOPAEDIC SURGERY

## 2022-03-03 PROCEDURE — 77030018673: Performed by: ORTHOPAEDIC SURGERY

## 2022-03-03 PROCEDURE — 74011250636 HC RX REV CODE- 250/636: Performed by: ORTHOPAEDIC SURGERY

## 2022-03-03 PROCEDURE — 77030040361 HC SLV COMPR DVT MDII -B: Performed by: ORTHOPAEDIC SURGERY

## 2022-03-03 PROCEDURE — 74011000250 HC RX REV CODE- 250: Performed by: ORTHOPAEDIC SURGERY

## 2022-03-03 PROCEDURE — 77030008477 HC STYL SATN SLP COVD -A: Performed by: ANESTHESIOLOGY

## 2022-03-03 PROCEDURE — 82962 GLUCOSE BLOOD TEST: CPT

## 2022-03-03 PROCEDURE — 77030012716 HC ELEV PASS NEURO BSC -B: Performed by: ORTHOPAEDIC SURGERY

## 2022-03-03 PROCEDURE — 77030033138 HC SUT PGA STRATFX J&J -B: Performed by: ORTHOPAEDIC SURGERY

## 2022-03-03 PROCEDURE — C1778 LEAD, NEUROSTIMULATOR: HCPCS | Performed by: ORTHOPAEDIC SURGERY

## 2022-03-03 PROCEDURE — 77030018390 HC SPNG HEMSTAT2 J&J -B: Performed by: ORTHOPAEDIC SURGERY

## 2022-03-03 PROCEDURE — 74011636637 HC RX REV CODE- 636/637: Performed by: ANESTHESIOLOGY

## 2022-03-03 PROCEDURE — 2709999900 HC NON-CHARGEABLE SUPPLY: Performed by: ORTHOPAEDIC SURGERY

## 2022-03-03 PROCEDURE — 74011250636 HC RX REV CODE- 250/636: Performed by: ANESTHESIOLOGY

## 2022-03-03 PROCEDURE — 74011250637 HC RX REV CODE- 250/637: Performed by: ANESTHESIOLOGY

## 2022-03-03 PROCEDURE — 76210000016 HC OR PH I REC 1 TO 1.5 HR: Performed by: ORTHOPAEDIC SURGERY

## 2022-03-03 PROCEDURE — 77030002912 HC SUT ETHBND J&J -A: Performed by: ORTHOPAEDIC SURGERY

## 2022-03-03 PROCEDURE — 76210000022 HC REC RM PH II 1.5 TO 2 HR: Performed by: ORTHOPAEDIC SURGERY

## 2022-03-03 PROCEDURE — 77030013708 HC HNDPC SUC IRR PULS STRY –B: Performed by: ORTHOPAEDIC SURGERY

## 2022-03-03 PROCEDURE — 77030010507 HC ADH SKN DERMBND J&J -B: Performed by: ORTHOPAEDIC SURGERY

## 2022-03-03 PROCEDURE — 76060000034 HC ANESTHESIA 1.5 TO 2 HR: Performed by: ORTHOPAEDIC SURGERY

## 2022-03-03 PROCEDURE — 77030020268 HC MISC GENERAL SUPPLY: Performed by: ORTHOPAEDIC SURGERY

## 2022-03-03 PROCEDURE — 74011250636 HC RX REV CODE- 250/636: Performed by: NURSE ANESTHETIST, CERTIFIED REGISTERED

## 2022-03-03 DEVICE — KIT NEUROSTIM L50CM SPNL CRD STIM PADDLE 32 CNTCT: Type: IMPLANTABLE DEVICE | Site: SPINE THORACIC | Status: FUNCTIONAL

## 2022-03-03 DEVICE — IMPLANTABLE PULSE GENERATOR KIT
Type: IMPLANTABLE DEVICE | Site: SPINE THORACIC | Status: FUNCTIONAL
Brand: WAVEWRITER ALPHA™

## 2022-03-03 RX ORDER — FLUMAZENIL 0.1 MG/ML
0.2 INJECTION INTRAVENOUS
Status: DISCONTINUED | OUTPATIENT
Start: 2022-03-03 | End: 2022-03-03 | Stop reason: HOSPADM

## 2022-03-03 RX ORDER — NALOXONE HYDROCHLORIDE 0.4 MG/ML
0.2 INJECTION, SOLUTION INTRAMUSCULAR; INTRAVENOUS; SUBCUTANEOUS AS NEEDED
Status: DISCONTINUED | OUTPATIENT
Start: 2022-03-03 | End: 2022-03-03 | Stop reason: HOSPADM

## 2022-03-03 RX ORDER — SODIUM CHLORIDE, SODIUM LACTATE, POTASSIUM CHLORIDE, CALCIUM CHLORIDE 600; 310; 30; 20 MG/100ML; MG/100ML; MG/100ML; MG/100ML
1000 INJECTION, SOLUTION INTRAVENOUS CONTINUOUS
Status: DISCONTINUED | OUTPATIENT
Start: 2022-03-03 | End: 2022-03-03 | Stop reason: HOSPADM

## 2022-03-03 RX ORDER — ATENOLOL 25 MG/1
50 TABLET ORAL DAILY
Status: DISCONTINUED | OUTPATIENT
Start: 2022-03-03 | End: 2022-03-03 | Stop reason: HOSPADM

## 2022-03-03 RX ORDER — CEPHALEXIN 500 MG/1
500 CAPSULE ORAL 4 TIMES DAILY
Qty: 28 CAPSULE | Refills: 0 | Status: SHIPPED | OUTPATIENT
Start: 2022-03-03 | End: 2022-11-02

## 2022-03-03 RX ORDER — HYDRALAZINE HYDROCHLORIDE 20 MG/ML
INJECTION INTRAMUSCULAR; INTRAVENOUS AS NEEDED
Status: DISCONTINUED | OUTPATIENT
Start: 2022-03-03 | End: 2022-03-03 | Stop reason: HOSPADM

## 2022-03-03 RX ORDER — DEXAMETHASONE SODIUM PHOSPHATE 4 MG/ML
INJECTION, SOLUTION INTRA-ARTICULAR; INTRALESIONAL; INTRAMUSCULAR; INTRAVENOUS; SOFT TISSUE AS NEEDED
Status: DISCONTINUED | OUTPATIENT
Start: 2022-03-03 | End: 2022-03-03 | Stop reason: HOSPADM

## 2022-03-03 RX ORDER — SODIUM CHLORIDE, SODIUM LACTATE, POTASSIUM CHLORIDE, CALCIUM CHLORIDE 600; 310; 30; 20 MG/100ML; MG/100ML; MG/100ML; MG/100ML
125 INJECTION, SOLUTION INTRAVENOUS CONTINUOUS
Status: DISCONTINUED | OUTPATIENT
Start: 2022-03-03 | End: 2022-03-03 | Stop reason: HOSPADM

## 2022-03-03 RX ORDER — DEXTROSE MONOHYDRATE 100 MG/ML
0-250 INJECTION, SOLUTION INTRAVENOUS AS NEEDED
Status: DISCONTINUED | OUTPATIENT
Start: 2022-03-03 | End: 2022-03-03 | Stop reason: HOSPADM

## 2022-03-03 RX ORDER — MAGNESIUM SULFATE 100 %
4 CRYSTALS MISCELLANEOUS AS NEEDED
Status: DISCONTINUED | OUTPATIENT
Start: 2022-03-03 | End: 2022-03-03 | Stop reason: HOSPADM

## 2022-03-03 RX ORDER — LIDOCAINE HYDROCHLORIDE 20 MG/ML
INJECTION, SOLUTION EPIDURAL; INFILTRATION; INTRACAUDAL; PERINEURAL AS NEEDED
Status: DISCONTINUED | OUTPATIENT
Start: 2022-03-03 | End: 2022-03-03 | Stop reason: HOSPADM

## 2022-03-03 RX ORDER — FENTANYL CITRATE 50 UG/ML
25 INJECTION, SOLUTION INTRAMUSCULAR; INTRAVENOUS AS NEEDED
Status: DISCONTINUED | OUTPATIENT
Start: 2022-03-03 | End: 2022-03-03 | Stop reason: HOSPADM

## 2022-03-03 RX ORDER — OXYCODONE AND ACETAMINOPHEN 5; 325 MG/1; MG/1
1 TABLET ORAL AS NEEDED
Status: DISCONTINUED | OUTPATIENT
Start: 2022-03-03 | End: 2022-03-03 | Stop reason: HOSPADM

## 2022-03-03 RX ORDER — ATENOLOL 25 MG/1
50 TABLET ORAL DAILY
Status: DISCONTINUED | OUTPATIENT
Start: 2022-03-03 | End: 2022-03-03

## 2022-03-03 RX ORDER — HYDROCODONE BITARTRATE AND ACETAMINOPHEN 5; 325 MG/1; MG/1
1 TABLET ORAL
Qty: 28 TABLET | Refills: 0 | Status: SHIPPED | OUTPATIENT
Start: 2022-03-03 | End: 2022-03-06

## 2022-03-03 RX ORDER — ALBUTEROL SULFATE 0.83 MG/ML
2.5 SOLUTION RESPIRATORY (INHALATION) AS NEEDED
Status: DISCONTINUED | OUTPATIENT
Start: 2022-03-03 | End: 2022-03-03 | Stop reason: HOSPADM

## 2022-03-03 RX ORDER — INSULIN LISPRO 100 [IU]/ML
INJECTION, SOLUTION INTRAVENOUS; SUBCUTANEOUS ONCE
Status: COMPLETED | OUTPATIENT
Start: 2022-03-03 | End: 2022-03-03

## 2022-03-03 RX ORDER — ROCURONIUM BROMIDE 10 MG/ML
INJECTION, SOLUTION INTRAVENOUS AS NEEDED
Status: DISCONTINUED | OUTPATIENT
Start: 2022-03-03 | End: 2022-03-03 | Stop reason: HOSPADM

## 2022-03-03 RX ORDER — SODIUM CHLORIDE, SODIUM LACTATE, POTASSIUM CHLORIDE, AND CALCIUM CHLORIDE .6; .31; .03; .02 G/100ML; G/100ML; G/100ML; G/100ML
IRRIGANT IRRIGATION AS NEEDED
Status: DISCONTINUED | OUTPATIENT
Start: 2022-03-03 | End: 2022-03-03 | Stop reason: HOSPADM

## 2022-03-03 RX ORDER — PROPOFOL 10 MG/ML
INJECTION, EMULSION INTRAVENOUS AS NEEDED
Status: DISCONTINUED | OUTPATIENT
Start: 2022-03-03 | End: 2022-03-03 | Stop reason: HOSPADM

## 2022-03-03 RX ORDER — KETAMINE HYDROCHLORIDE 10 MG/ML
INJECTION, SOLUTION INTRAMUSCULAR; INTRAVENOUS AS NEEDED
Status: DISCONTINUED | OUTPATIENT
Start: 2022-03-03 | End: 2022-03-03 | Stop reason: HOSPADM

## 2022-03-03 RX ORDER — FENTANYL CITRATE 50 UG/ML
INJECTION, SOLUTION INTRAMUSCULAR; INTRAVENOUS AS NEEDED
Status: DISCONTINUED | OUTPATIENT
Start: 2022-03-03 | End: 2022-03-03 | Stop reason: HOSPADM

## 2022-03-03 RX ORDER — GLYCOPYRROLATE 0.2 MG/ML
INJECTION INTRAMUSCULAR; INTRAVENOUS AS NEEDED
Status: DISCONTINUED | OUTPATIENT
Start: 2022-03-03 | End: 2022-03-03 | Stop reason: HOSPADM

## 2022-03-03 RX ORDER — ONDANSETRON 2 MG/ML
4 INJECTION INTRAMUSCULAR; INTRAVENOUS ONCE
Status: DISCONTINUED | OUTPATIENT
Start: 2022-03-03 | End: 2022-03-03 | Stop reason: HOSPADM

## 2022-03-03 RX ORDER — EPHEDRINE SULFATE/0.9% NACL/PF 50 MG/5 ML
SYRINGE (ML) INTRAVENOUS AS NEEDED
Status: DISCONTINUED | OUTPATIENT
Start: 2022-03-03 | End: 2022-03-03 | Stop reason: HOSPADM

## 2022-03-03 RX ORDER — NEOSTIGMINE METHYLSULFATE 1 MG/ML
INJECTION, SOLUTION INTRAVENOUS AS NEEDED
Status: DISCONTINUED | OUTPATIENT
Start: 2022-03-03 | End: 2022-03-03 | Stop reason: HOSPADM

## 2022-03-03 RX ORDER — INSULIN LISPRO 100 [IU]/ML
INJECTION, SOLUTION INTRAVENOUS; SUBCUTANEOUS ONCE
Status: DISCONTINUED | OUTPATIENT
Start: 2022-03-03 | End: 2022-03-03 | Stop reason: HOSPADM

## 2022-03-03 RX ORDER — CEFAZOLIN SODIUM/WATER 2 G/20 ML
2 SYRINGE (ML) INTRAVENOUS ONCE
Status: COMPLETED | OUTPATIENT
Start: 2022-03-03 | End: 2022-03-03

## 2022-03-03 RX ORDER — HYDROMORPHONE HYDROCHLORIDE 1 MG/ML
0.5 INJECTION, SOLUTION INTRAMUSCULAR; INTRAVENOUS; SUBCUTANEOUS
Status: DISCONTINUED | OUTPATIENT
Start: 2022-03-03 | End: 2022-03-03 | Stop reason: HOSPADM

## 2022-03-03 RX ORDER — DIPHENHYDRAMINE HYDROCHLORIDE 50 MG/ML
12.5 INJECTION, SOLUTION INTRAMUSCULAR; INTRAVENOUS
Status: DISCONTINUED | OUTPATIENT
Start: 2022-03-03 | End: 2022-03-03 | Stop reason: HOSPADM

## 2022-03-03 RX ORDER — ONDANSETRON 2 MG/ML
INJECTION INTRAMUSCULAR; INTRAVENOUS AS NEEDED
Status: DISCONTINUED | OUTPATIENT
Start: 2022-03-03 | End: 2022-03-03 | Stop reason: HOSPADM

## 2022-03-03 RX ORDER — SUCCINYLCHOLINE CHLORIDE 100 MG/5ML
SYRINGE (ML) INTRAVENOUS AS NEEDED
Status: DISCONTINUED | OUTPATIENT
Start: 2022-03-03 | End: 2022-03-03 | Stop reason: HOSPADM

## 2022-03-03 RX ORDER — MIDAZOLAM HYDROCHLORIDE 1 MG/ML
INJECTION, SOLUTION INTRAMUSCULAR; INTRAVENOUS AS NEEDED
Status: DISCONTINUED | OUTPATIENT
Start: 2022-03-03 | End: 2022-03-03 | Stop reason: HOSPADM

## 2022-03-03 RX ADMIN — PROPOFOL 200 MG: 10 INJECTION, EMULSION INTRAVENOUS at 09:28

## 2022-03-03 RX ADMIN — INSULIN LISPRO 3 UNITS: 100 INJECTION, SOLUTION INTRAVENOUS; SUBCUTANEOUS at 07:38

## 2022-03-03 RX ADMIN — OXYCODONE AND ACETAMINOPHEN 1 TABLET: 5; 325 TABLET ORAL at 12:51

## 2022-03-03 RX ADMIN — DEXAMETHASONE SODIUM PHOSPHATE 8 MG: 4 INJECTION, SOLUTION INTRAMUSCULAR; INTRAVENOUS at 09:57

## 2022-03-03 RX ADMIN — LIDOCAINE HYDROCHLORIDE 60 MG: 20 INJECTION, SOLUTION INTRAVENOUS at 09:28

## 2022-03-03 RX ADMIN — Medication 3 MG: at 10:34

## 2022-03-03 RX ADMIN — FENTANYL CITRATE 100 MCG: 50 INJECTION, SOLUTION INTRAMUSCULAR; INTRAVENOUS at 09:28

## 2022-03-03 RX ADMIN — SODIUM CHLORIDE, SODIUM LACTATE, POTASSIUM CHLORIDE, AND CALCIUM CHLORIDE 125 ML/HR: 600; 310; 30; 20 INJECTION, SOLUTION INTRAVENOUS at 07:30

## 2022-03-03 RX ADMIN — Medication 2 G: at 09:51

## 2022-03-03 RX ADMIN — SODIUM CHLORIDE, SODIUM LACTATE, POTASSIUM CHLORIDE, AND CALCIUM CHLORIDE 125 ML/HR: 600; 310; 30; 20 INJECTION, SOLUTION INTRAVENOUS at 14:00

## 2022-03-03 RX ADMIN — GLYCOPYRROLATE 0.2 MG: 0.2 INJECTION INTRAMUSCULAR; INTRAVENOUS at 09:24

## 2022-03-03 RX ADMIN — HYDRALAZINE HYDROCHLORIDE 10 MG: 20 INJECTION, SOLUTION INTRAMUSCULAR; INTRAVENOUS at 11:20

## 2022-03-03 RX ADMIN — FENTANYL CITRATE 25 MCG: 50 INJECTION, SOLUTION INTRAMUSCULAR; INTRAVENOUS at 11:42

## 2022-03-03 RX ADMIN — INSULIN LISPRO 2 UNITS: 100 INJECTION, SOLUTION INTRAVENOUS; SUBCUTANEOUS at 09:03

## 2022-03-03 RX ADMIN — KETAMINE HYDROCHLORIDE 50 MG: 10 INJECTION, SOLUTION INTRAMUSCULAR; INTRAVENOUS at 09:56

## 2022-03-03 RX ADMIN — ATENOLOL 50 MG: 25 TABLET ORAL at 08:06

## 2022-03-03 RX ADMIN — Medication 10 MG: at 10:10

## 2022-03-03 RX ADMIN — ONDANSETRON HYDROCHLORIDE 4 MG: 2 INJECTION INTRAMUSCULAR; INTRAVENOUS at 09:24

## 2022-03-03 RX ADMIN — ROCURONIUM BROMIDE 50 MG: 10 INJECTION, SOLUTION INTRAVENOUS at 09:29

## 2022-03-03 RX ADMIN — Medication 40 MG: at 10:50

## 2022-03-03 RX ADMIN — SODIUM CHLORIDE, SODIUM LACTATE, POTASSIUM CHLORIDE, AND CALCIUM CHLORIDE: 600; 310; 30; 20 INJECTION, SOLUTION INTRAVENOUS at 10:12

## 2022-03-03 RX ADMIN — GLYCOPYRROLATE 0.4 MG: 0.2 INJECTION INTRAMUSCULAR; INTRAVENOUS at 10:34

## 2022-03-03 RX ADMIN — MIDAZOLAM 2 MG: 1 INJECTION INTRAMUSCULAR; INTRAVENOUS at 09:21

## 2022-03-03 NOTE — ANESTHESIA PREPROCEDURE EVALUATION
Relevant Problems   No relevant active problems       Anesthetic History   No history of anesthetic complications            Review of Systems / Medical History  Patient summary reviewed, nursing notes reviewed and pertinent labs reviewed    Pulmonary                Comments: H/o histoplasmosis   Neuro/Psych   Within defined limits           Cardiovascular    Hypertension              Exercise tolerance: >4 METS     GI/Hepatic/Renal  Within defined limits              Endo/Other    Diabetes    Obesity     Other Findings              Physical Exam    Airway  Mallampati: III  TM Distance: 4 - 6 cm  Neck ROM: normal range of motion   Mouth opening: Normal     Cardiovascular               Dental  No notable dental hx       Pulmonary                 Abdominal         Other Findings            Anesthetic Plan    ASA: 2  Anesthesia type: general          Induction: Intravenous  Anesthetic plan and risks discussed with: Patient

## 2022-03-03 NOTE — DISCHARGE INSTRUCTIONS
[facility]  Dr. Elizabeth Luong Instructions for Spinal Cord Stimulator placement    Diet:  1. Begin with liquids and light foods such as Jell-O and soups. 2. Advance as tolerated to your regular diet if not nauseated. First 24 hours:  1. Be in the care of a responsible adult. 2. Do not drive or operate machinery. 3. Do not drink alcoholic beverages. Wound Care:  1. Maintain your postoperative dressing for 72 hours then remove pad and replace with gauze. Then change dressing daily. 2.  A home health nurse will come to your house to help you with dressing changes. 2. Keep the surgical incisions dry until follow-up. Medications:  1. Strong oral narcotic pain medications have been prescribed for the first few days. Use only as directed. No pain medication is capable of taking away all the pain. Taking your pills at regular intervals will give you the best chance of having less pain. 2. If you need a refill PLEASE PLAN AHEAD. Call our office during regular hours (8-5). 3. Do not combine with alcoholic beverages. 4. Be careful as you walk, climb stairs or drive as mild dizziness is not unusual.  5. Do not take medications that have not been prescribed by your surgeon. 6. You may switch to over the counter pain medication of your choice as you become more comfortable. WHEN TO CALL YOUR SURGEON:  1. Unrelenting pain  2. Fever or Chills  3. Redness around incisions  4. Color change in foot or toes  5. Continuous drainage or bleeding from wounds (a small amount of drainage is expected)  6. Any other worrisome condition    WHEN TO CALL YOUR REGULAR DOCTOR:  1. Flare up of any of your regular medical conditions    WHEN TO CALL 911:  1. Chest Pain  2. Shortness of Breath  3. Any other acute serious condition    CALL THE OFFICE:  If you have severe pain unrelieved by the medications; If you have a fever of 101.0°F or greater;    If you notice excessive swelling, redness, or persistent drainage from the incision or IV site; [facility] office number is [phone] from 8:00am to 5:00pm Monday through Friday. After 5:00pm, on weekends, or holidays, please leave a message with our answering service and the doctor on-call will get back to you shortly. DISCHARGE SUMMARY from Nurse    PATIENT INSTRUCTIONS:    After general anesthesia or intravenous sedation, for 24 hours or while taking prescription Narcotics:  · Limit your activities  · Do not drive and operate hazardous machinery  · Do not make important personal or business decisions  · Do  not drink alcoholic beverages  · If you have not urinated within 8 hours after discharge, please contact your surgeon on call. Report the following to your surgeon:  · Excessive pain, swelling, redness or odor of or around the surgical area  · Temperature over 100.5  · Nausea and vomiting lasting longer than 4 hours or if unable to take medications  · Any signs of decreased circulation or nerve impairment to extremity: change in color, persistent  numbness, tingling, coldness or increase pain  · Any questions    What to do at Home:  Recommended activity: Activity as tolerated and no driving for today,     If you experience any of the following symptoms as noted above, please follow up with Dr. Yesenia Turner. *  Please give a list of your current medications to your Primary Care Provider. *  Please update this list whenever your medications are discontinued, doses are      changed, or new medications (including over-the-counter products) are added. *  Please carry medication information at all times in case of emergency situations. These are general instructions for a healthy lifestyle:    No smoking/ No tobacco products/ Avoid exposure to second hand smoke  Surgeon General's Warning:  Quitting smoking now greatly reduces serious risk to your health.     Obesity, smoking, and sedentary lifestyle greatly increases your risk for illness    A healthy diet, regular physical exercise & weight monitoring are important for maintaining a healthy lifestyle    You may be retaining fluid if you have a history of heart failure or if you experience any of the following symptoms:  Weight gain of 3 pounds or more overnight or 5 pounds in a week, increased swelling in our hands or feet or shortness of breath while lying flat in bed. Please call your doctor as soon as you notice any of these symptoms; do not wait until your next office visit. The discharge information has been reviewed with the patient and caregiver. The patient and caregiver verbalized understanding. Discharge medications reviewed with the patient and caregiver and appropriate educational materials and side effects teaching were provided.   ___________________________________________________________________________________________________________________________________

## 2022-03-03 NOTE — PERIOP NOTES
TRANSFER - IN REPORT:    Verbal report received from [facility] Friday, RN on Tushar Share  being received from PACU for routine post - op      Report consisted of patients Situation, Background, Assessment and   Recommendations(SBAR). Information from the following report(s) SBAR, Kardex and MAR was reviewed with the receiving nurse. Opportunity for questions and clarification was provided. Assessment completed upon patients arrival to unit and care assumed.

## 2022-03-03 NOTE — PERIOP NOTES
Notified Dr. Carina Jeff of patient's heart rate of 54 and blood pressure of 149/75. Patient usually takes atenolol 50mg once a day in the morning and did not take this morning. Per Dr. Carina Jeff, orders received to administer usual home dose medication 50mg atenolol PO in preop before surgery. Per Dr. Carina Jeff, okay to proceed with administration with heart rate of 54.

## 2022-03-03 NOTE — H&P
History and Physical        Patient: Thad Longoria               Sex: male          DOA: 3/3/2022         YOB: 1960      Age:  62 y.o.        LOS:  LOS: 0 days        HPI:     Thad Longoria is a 62 y.o. male who has a history of back pain. Their pain is typically across the lower back and is chronic in nature. He has numbness/tingling in the same distribution of their pain. He denies weakness in the bilateral lower extremity. Their pain is worse with ambulation and better at rest. He has failed conservative care including anti-inflammatories, analgesics, physical therapy and injections. Their pain affects their activities of daily living and would like to move forward with surgical intervention. 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    Renal agenesis        Past Surgical History:   Procedure Laterality Date    HX HEENT      removal of uvula    HX LUMBAR DISKECTOMY  's    HX LUMBAR LAMINECTOMY  's    HX ORTHOPAEDIC Right 2002    screw in foot     HX ORTHOPAEDIC Left 2019    great toe surgery    HX OTHER SURGICAL Bilateral     removal loose bodies elbow    IR IMPLANT SPINE NEURSTIM ELTRD         Family History   Problem Relation Age of Onset    Diabetes Paternal Grandmother        Social History     Socioeconomic History    Marital status: SINGLE   Tobacco Use    Smoking status: Former Smoker     Quit date:      Years since quittin.1    Smokeless tobacco: Never Used   Vaping Use    Vaping Use: Never used   Substance and Sexual Activity    Alcohol use: Yes     Alcohol/week: 6.0 standard drinks     Types: 6 Glasses of wine per week    Drug use: Never    Sexual activity: Not Currently       Prior to Admission medications    Medication Sig Start Date End Date Taking?  Authorizing Provider   glipiZIDE SR (GLUCOTROL XL) 10 mg CR tablet Take 10 mg by mouth daily. Yes Provider, Historical   celecoxib (CeleBREX) 200 mg capsule Take 200 mg by mouth daily. Yes Provider, Historical   finasteride (Proscar) 5 mg tablet Take 1 Tablet by mouth daily. 2/21/22  Yes DERECK Hunt   tadalafiL (CIALIS) 5 mg tablet Take 1 Tablet by mouth daily. Indications: enlarged prostate with urination problem  Patient taking differently: Take 5 mg by mouth nightly. Indications: enlarged prostate with urination problem 11/18/21  Yes DERECK Hunt   SITagliptin (Januvia) 100 mg tablet Take 100 mg by mouth daily. 5/2/21  Yes Provider, Historical   atorvastatin (LIPITOR) 40 mg tablet Take 40 mg by mouth daily. Yes Provider, Historical   amLODIPine (Norvasc) 10 mg tablet Take 10 mg by mouth daily. Yes Provider, Historical   atenoloL (TENORMIN) 50 mg tablet Take 50 mg by mouth daily. Yes Provider, Historical   metFORMIN (GLUCOPHAGE) 500 mg tablet Take 500 mg by mouth two (2) times daily (with meals). Yes Provider, Historical   albuterol (PROVENTIL HFA, VENTOLIN HFA, PROAIR HFA) 90 mcg/actuation inhaler Take 2 Puffs by inhalation every six (6) hours as needed. 10/25/21   Provider, Historical   COVID-19 mRNA Vacc (MODERNA) 100 mcg/0.5 mL susp injection  3/17/21   Provider, Historical   sildenafil citrate (VIAGRA) 100 mg tablet Take 1 Tablet by mouth daily as needed for Erectile Dysfunction. Patient not taking: Reported on 11/22/2021 11/18/21   DERECK Hunt       Allergies   Allergen Reactions    Venom-Honey Bee Swelling       Review of Systems  A comprehensive review of systems was negative except for that written in the History of Present Illness.       Physical Exam:      Visit Vitals  BP (!) 149/75 (BP 1 Location: Left upper arm, BP Patient Position: At rest;Sitting)   Pulse (!) 54   Temp 96.9 °F (36.1 °C)   Resp 16   Ht 5' 11\" (1.803 m)   Wt 103.1 kg (227 lb 4.8 oz)   SpO2 94%   BMI 31.70 kg/m²       Physical Exam:  General: Alert and Oriented X 3  Lungs: Clear to ausculation bilaterally  Cardiovascular: Regular Rate and Rhythm, without murmur  Abdomen: Soft, nontender with positive bowel sounds in all four quadrants  Gential/Rectal: deferred  Musculoskeletal: The paitent has full range of motion of the lumbar spine in flexion, extension and side bending bilaterally. The patient has pain with flexion or extension. There is not pain with internal and external rotation of the bilaterally hip. The patient is tender across the left paralumbar, right paralumbar muscles. The patient is neurologically intact in the lower extremities. There is a Negative straight leg raise. His pulses are 2+. Calves are nontender. Labs Reviewed: All lab results for the last 24 hours reviewed. Assessment/Plan   Problems    Chronic pain syndrome  Chronic back pain    At this time, we discussed that we will move forward with permanent placement of a spinal cord stimulator, as she responded so well to the trial. We discussed that we would move forward with a T9 Laminotomy with placement of the paddle in the T7-T8 disc space centrally. We will place the battery in the gluteal area. The risks versus the benefits as well as the alternatives were fully explained to the patient.

## 2022-03-03 NOTE — INTERVAL H&P NOTE
Update History & Physical    The Patient's History and Physical of March 3,   Chart was reviewed with the patient and I examined the patient. There was no change. The surgical site was confirmed by the patient and me. Plan:  The risk, benefits, expected outcome, and alternative to the recommended procedure have been discussed with the patient. Patient understands and wants to proceed with the procedure.     Electronically signed by Fermin Davalos MD on 3/3/2022 at 8:46 AM

## 2022-03-03 NOTE — ANESTHESIA POSTPROCEDURE EVALUATION
Post-Anesthesia Evaluation and Assessment    Cardiovascular Function/Vital Signs  Visit Vitals  /62   Pulse (!) 57   Temp (!) 35.9 °C (96.6 °F)   Resp 14   Ht 5' 11\" (1.803 m)   Wt 103.1 kg (227 lb 4.8 oz)   SpO2 92%   BMI 31.70 kg/m²       Patient is status post Procedure(s):  THORACIC 9- LAMINOTOMY WITH PLACEMENT OF SPINAL CORD STIMULATOR AND BATTERY WITH C-ARM. Nausea/Vomiting: Controlled. Postoperative hydration reviewed and adequate. Pain:  Pain Scale 1: Numeric (0 - 10) (03/03/22 1349)  Pain Intensity 1: 10 (03/03/22 1349)   Managed. Neurological Status:   Neuro (WDL): Within Defined Limits (03/03/22 1349)   At baseline. Mental Status and Level of Consciousness: Baseline and appropriate for discharge. Pulmonary Status:   O2 Device: Nasal cannula (03/03/22 1349)   Adequate oxygenation and airway patent. Complications related to anesthesia: None    Post-anesthesia assessment completed. No concerns. Patient has met all discharge requirements.     Signed By: Good Tellez MD    March 3, 2022

## 2022-03-03 NOTE — PERIOP NOTES
Notified Sander العلي RN that patient had 3 units of insulin at [id number] for 188 blood sugar. Order for procedural consent is also not on on posting sheet. Sander العلي RN states that she will notify and clarify consent order with Dr. Soraya Walton prior to procedure.

## 2022-03-03 NOTE — OP NOTES
OPERATIVE NOTE    Patient: Jannie Almeida MRN: [id number]  SSN: [id number]    YOB: 1960  Age: 62 y.o. Sex: male      Indications: This is a 62y.o. year-old male who presents with chronic back pain. He was positive for relief from a temporary spinal cord stimulator. The patient was admitted for surgery as conservative measures have failed. Date of Procedure: 3/3/2022     Preoperative Diagnosis: CHRONIC PAIN SYNDROME    Postoperative Diagnosis: CHRONIC PAIN SYNDROME      Procedure: Procedure(s):  THORACIC 9- LAMINOTOMY WITH PLACEMENT OF SPINAL CORD STIMULATOR AND BATTERY WITH C-ARM    Surgeon: Luis Singh DO, and Surgical Nata Jarquin NP    Assistant: Butch Sherman: Dina Romo RN  Circ-2: Lisa Cortez RN  Physician Assistant: Angel Marcelo PA-C  Radiology Technician: Narayan ROMAN  Scrub Tech-1: Rakeshrocky Bucio  Nurse Practitioner: Meme Barone NP  Float Staff: [facility]    Anesthesia: GETA    Estimated Blood Loss: 20cc    Specimens: * No specimens in log *     Drains: none    Implants:   Implant Name Type Inv. Item Serial No.  Lot No. LRB No. Used Action   Wave writer alpha implantable pulse generator kit   [id number] [employer]ENT[employer]WD [id number] N/A 1 Implanted   KIT NEUROSTIM L50CM SPNL CRD STIM PADDLE 32 CNTCT - [id number]  KIT NEUROSTIM L50CM SPNL CRD STIM PADDLE 32 CNTCT [id number] [employer] NEUROMODULATION[employer]WD  N/A 1 Implanted       Complications: None; patient tolerated the procedure well. Procedure: The patient was greeted by anesthesia and taken to the operative suite, where the patient underwent general endotracheal anesthesia. The patient was positioned in the prone position on a standard radiolucent Iglesia spine table with the Avelino frame. The thoracolumbar spine as well as theright gluteal area was sterilely prepped and draped in standard fashion. Fluoroscopy confirmed the thoracic 9 lamina.   A small 1.5-inch incision was made over the posterior spinous processes. The paraspinal musculature was elevated with electrocautery and a Martinez retractor was placed. Again, fluoroscopy confirmed the appropriate level and a small laminotomy was preformed over the inferior aspect of the thoracic 9 lamina. Once entrance into the epidural space was confirmed the [facility] Scientific  paddle was slowly slid in the posterior epidural space, centered over the Thoracic 7 - 8 vertebraes as planned preoperatively. Once this was confirmed radiographically it was sutured into place on the remaining lamina and fascia utilizing Ethibond suture. A 2 inch incision was made over the left gluteal area below the belt line. A pocket was created 1 inch in depth. The leads from the spinal cord stimulator were tunneled into the gluteal pocket and connected to the pulse generator battery. The representative for the spinal cord stimulator confirmed that the system was functional and a basic inital programming was accomplished. The pulse generator was subsequently placed into the gluteal pocket. Both incisions were irrigated with 1000ml of sterile saline with bacitracin utilizing pulse lavage. The incision over the gluteal pocket was reapproximated with 2-0 Vicryl in subcutaneous fashion, and running 3-0 Monocryl in subcuticular fashion. A final layer of Dermabolnd skin sealant was placed as well as soft sterile dressing and tape. For the thoracic laminotomy incision the fascia was reapproximated with figure of eight #1 Vicryl suture. The skin edges were reapproximated with 2-0 Vicryl in subcutaneous fashion, and the skin was closed with running 3-0 Monocryl in subcuticular fashion. Layer of Dermabond skin sealant was again placed as well as a layer of dressing and tape.   My Physician Assistant was utilized as a co-surgeon for this case to include vascular retraction, suction, gluteal pocket preparation, electrode passage, irrigation and final closure of surgical incisions. This assistance was instrumental to the safety and success of this surgical case. The patient recovered from anesthesia and was transferred to the postanesthesia care unit in stable condition. Final x-rays confirmed excellent position of the spinal cord stimulator, centered over the thoracic 7 - 8 vertebrae as planned.

## 2022-03-18 PROBLEM — G89.29 CHRONIC BACK PAIN: Status: ACTIVE | Noted: 2021-11-18

## 2022-03-18 PROBLEM — M54.9 CHRONIC BACK PAIN: Status: ACTIVE | Noted: 2021-11-18

## 2022-03-20 PROBLEM — G89.4 CHRONIC PAIN SYNDROME: Status: ACTIVE | Noted: 2021-11-18

## 2022-10-24 ENCOUNTER — HOSPITAL ENCOUNTER (OUTPATIENT)
Dept: PREADMISSION TESTING | Age: 62
Discharge: HOME | End: 2022-10-24
Payer: COMMERCIAL

## 2022-10-24 ENCOUNTER — TRANSCRIBE ORDER (OUTPATIENT)
Dept: REGISTRATION | Age: 62
End: 2022-10-24

## 2022-10-24 DIAGNOSIS — M75.102 ROTATOR CUFF SYNDROME OF LEFT SHOULDER: Primary | ICD-10-CM

## 2022-10-24 DIAGNOSIS — M75.102 ROTATOR CUFF SYNDROME OF LEFT SHOULDER: ICD-10-CM

## 2022-10-24 LAB
ALBUMIN SERPL-MCNC: 4 G/DL (ref 3.4–5)
ALBUMIN/GLOB SERPL: 1.3 {RATIO} (ref 0.8–1.7)
ALP SERPL-CCNC: 66 U/L (ref 45–117)
ALT SERPL-CCNC: 18 U/L (ref 16–61)
ANION GAP SERPL CALC-SCNC: 6 MMOL/L (ref 3–18)
AST SERPL-CCNC: 8 U/L (ref 10–38)
ATRIAL RATE: 58 BPM
BASOPHILS # BLD: 0 K/UL (ref 0–0.1)
BASOPHILS NFR BLD: 1 % (ref 0–2)
BILIRUB SERPL-MCNC: 0.7 MG/DL (ref 0.2–1)
BUN SERPL-MCNC: 27 MG/DL (ref 7–18)
BUN/CREAT SERPL: 14 (ref 12–20)
CALCIUM SERPL-MCNC: 9.3 MG/DL (ref 8.5–10.1)
CALCULATED P AXIS, ECG09: 54 DEGREES
CALCULATED R AXIS, ECG10: 56 DEGREES
CALCULATED T AXIS, ECG11: 60 DEGREES
CHLORIDE SERPL-SCNC: 103 MMOL/L (ref 100–111)
CO2 SERPL-SCNC: 28 MMOL/L (ref 21–32)
CREAT SERPL-MCNC: 1.91 MG/DL (ref 0.6–1.3)
DIAGNOSIS, 93000: NORMAL
DIFFERENTIAL METHOD BLD: ABNORMAL
EOSINOPHIL # BLD: 0.2 K/UL (ref 0–0.4)
EOSINOPHIL NFR BLD: 3 % (ref 0–5)
ERYTHROCYTE [DISTWIDTH] IN BLOOD BY AUTOMATED COUNT: 12.2 % (ref 11.6–14.5)
GLOBULIN SER CALC-MCNC: 3.2 G/DL (ref 2–4)
GLUCOSE SERPL-MCNC: 247 MG/DL (ref 74–99)
HBA1C MFR BLD: 9.6 % (ref 4.2–5.6)
HCT VFR BLD AUTO: 36.9 % (ref 36–48)
HGB BLD-MCNC: 12.1 G/DL (ref 13–16)
IMM GRANULOCYTES # BLD AUTO: 0 K/UL (ref 0–0.04)
IMM GRANULOCYTES NFR BLD AUTO: 1 % (ref 0–0.5)
LYMPHOCYTES # BLD: 1 K/UL (ref 0.9–3.6)
LYMPHOCYTES NFR BLD: 19 % (ref 21–52)
MCH RBC QN AUTO: 28.5 PG (ref 24–34)
MCHC RBC AUTO-ENTMCNC: 32.8 G/DL (ref 31–37)
MCV RBC AUTO: 87 FL (ref 78–100)
MONOCYTES # BLD: 0.7 K/UL (ref 0.05–1.2)
MONOCYTES NFR BLD: 13 % (ref 3–10)
NEUTS SEG # BLD: 3.4 K/UL (ref 1.8–8)
NEUTS SEG NFR BLD: 64 % (ref 40–73)
NRBC # BLD: 0 K/UL (ref 0–0.01)
NRBC BLD-RTO: 0 PER 100 WBC
P-R INTERVAL, ECG05: 196 MS
PLATELET # BLD AUTO: 189 K/UL (ref 135–420)
PMV BLD AUTO: 12.2 FL (ref 9.2–11.8)
POTASSIUM SERPL-SCNC: 5.2 MMOL/L (ref 3.5–5.5)
PROT SERPL-MCNC: 7.2 G/DL (ref 6.4–8.2)
Q-T INTERVAL, ECG07: 384 MS
QRS DURATION, ECG06: 66 MS
QTC CALCULATION (BEZET), ECG08: 376 MS
RBC # BLD AUTO: 4.24 M/UL (ref 4.35–5.65)
SODIUM SERPL-SCNC: 137 MMOL/L (ref 136–145)
VENTRICULAR RATE, ECG03: 58 BPM
WBC # BLD AUTO: 5.4 K/UL (ref 4.6–13.2)

## 2022-10-24 PROCEDURE — 36415 COLL VENOUS BLD VENIPUNCTURE: CPT

## 2022-10-24 PROCEDURE — 80053 COMPREHEN METABOLIC PANEL: CPT

## 2022-10-24 PROCEDURE — 93005 ELECTROCARDIOGRAM TRACING: CPT

## 2022-10-24 PROCEDURE — 83036 HEMOGLOBIN GLYCOSYLATED A1C: CPT

## 2022-10-24 PROCEDURE — 85025 COMPLETE CBC W/AUTO DIFF WBC: CPT

## 2022-11-02 ENCOUNTER — HOSPITAL ENCOUNTER (OUTPATIENT)
Dept: PREADMISSION TESTING | Age: 62
Discharge: HOME | End: 2022-11-02

## 2022-11-02 VITALS — WEIGHT: 225 LBS | BODY MASS INDEX: 31.5 KG/M2 | HEIGHT: 71 IN

## 2022-11-02 RX ORDER — TRIAMTERENE AND HYDROCHLOROTHIAZIDE 75; 50 MG/1; MG/1
1 TABLET ORAL DAILY
COMMUNITY

## 2022-11-02 RX ORDER — SODIUM CHLORIDE, SODIUM LACTATE, POTASSIUM CHLORIDE, CALCIUM CHLORIDE 600; 310; 30; 20 MG/100ML; MG/100ML; MG/100ML; MG/100ML
125 INJECTION, SOLUTION INTRAVENOUS CONTINUOUS
Status: CANCELLED | OUTPATIENT
Start: 2022-11-08

## 2022-11-02 RX ORDER — CEFAZOLIN SODIUM/WATER 2 G/20 ML
2 SYRINGE (ML) INTRAVENOUS ONCE
Status: CANCELLED | OUTPATIENT
Start: 2022-11-08 | End: 2022-11-08

## 2022-11-02 NOTE — PERIOP NOTES
PAT - SURGICAL PRE-ADMISSION INSTRUCTIONS    NAME:  Maggie Webber                                                          TODAY'S DATE:  11/2/2022    SURGERY DATE:  11/8/2022                                  SURGERY ARRIVAL TIME:   TBD    Do NOT eat or drink anything, including candy or gum, after MIDNIGHT on 11/8/2022 , unless you have specific instructions from your Surgeon or Anesthesia Provider to do so. No smoking 24 hours before surgery. No alcohol 24 hours prior to the day of surgery. No recreational drugs for one week prior to the day of surgery. Leave all valuables, including money/purse, at home. Remove all jewelry, nail polish, makeup (including mascara); no lotions, powders, deodorant, or perfume/cologne/after shave. Glasses/Contact lenses and Dentures may be worn to the hospital.  They will be removed prior to surgery. Call your doctor if symptoms of a cold or illness develop within 24 ours prior to surgery. AN ADULT MUST DRIVE YOU HOME AFTER OUTPATIENT SURGERY. If you are having an OUTPATIENT procedure, please make arrangements for a responsible adult to be with you for 24 hours after your surgery. If you are admitted to the hospital, you will be assigned to a bed after surgery is complete. Normally a family member will not be able to see you until you are in your assigned bed. 12. Visitation Restrictions Explained. Pt is spec pop: has spinal cord stimulator [employer] implanted to gluteal area  Saw PCP on 10/31 for medical clearance( Dr. María Worrell)  Pt denies an advanced directive, pt denies DNR. HgA1c 9.6, faxed to Dr. Joy Langley office. Pt states he was not watching his diet that week. Special Instructions:  Omit meformin dose pm on 11/7 and am on 11/8  Do not take januvia, glipizide and triamterene-HCTZ on dos. May take amlodipine, atenolol and atorvastatin on dos with sips of water  Pt instructed to avoid NSAIDs 7 days prior to procedure per MDA.    Pt instructed to hold vitamins/supplements 2 weeks prior to procedure per MDA. Patient Prep:  use CHG solution, pt received, instructions reviewed. These surgical instructions were reviewed with patient during the PAT phone interview.

## 2022-11-03 NOTE — PERIOP NOTES
Spoke with Re Diane and faxed Dr. Cross Milli request to notify pcp of creatinine increase.     0900 Also aware of HgA1C results 9.6

## 2022-11-07 ENCOUNTER — ANESTHESIA EVENT (OUTPATIENT)
Dept: SURGERY | Age: 62
End: 2022-11-07
Payer: COMMERCIAL

## 2022-11-07 NOTE — H&P
10/12/22      Patient Name:   Bhargav Sanders   Account #:  [de-identified]  YOB: 1960    Chief Complaint:  Left shoulder followup. History of Chief Complaint:  He had the MRI done for his left shoulder. This study is reviewed. It shows high-grade partial-thickness tear involving the supraspinatus, with tendinosis involving the remaining tendon. There is a SLAP tear and moderate DJD of the [facility] joint with signs of impingement. Past Medical/Surgical History:    Disease/Disorder Date Side Surgery Date Side Comment   Diabetes         Histoplasmosis         Hypertension            Spinal cord stimulator placement 03/03/2022        Spinal fusion, lumbar 1995       Allergies:  No known allergies. Ingredient Reaction Medication Name Comment   NO KNOWN ALLERGIES          Current Medications:    Medication Directions   albuterol sulfate HFA 90 mcg/actuation aerosol inhaler    amlodipine 10 mg tablet    atenolol 50 mg tablet    atorvastatin 40 mg tablet    celecoxib 200 mg capsule    finasteride 5 mg tablet    glipizide ER 10 mg tablet, extended release 24 hr    ibuprofen 800 mg tablet take 1 tablet by oral route 2 times every day with food   Januvia 100 mg tablet    metformin 500 mg tablet TAKE 1 TABLET BY MOUTH TWICE A DAY WITH MEALS   Percocet 5 mg-325 mg tablet take 1 tablet by oral route every 4 hours as needed   tadalafil    triamterene 75 mg-hydrochlorothiazide 50 mg tablet      Social History:    SMOKING  Status Tobacco Type Units Per Day Yrs Used   Former smoker Cigarette 0.50 19.00   ALCOHOL  There is a history of alcohol use. Type: Wine. consumed daily. Family History:    Disease Detail Family Member Age Cause of Death Comments   Family history of Diabetes   N    Hypertension Sister      Hypertension Brother        Review of Systems:    GENERAL:  Patient has no signs of chills. , fever, weight change or fatigue  HEAD/ENTM:  Patient has no signs of headaches, dizziness, hearing loss, ringing in ears, sore throat/hoarseness, recent cold, double vision, blurred vision, itchy eyes, eye redness or eye discharge. NEUROLOGIC:  Patient has no signs of fainting., muscle weakness, numbness/tingling, loss of balance or seizure disorder  CARDIOVASCULAR:  Patient has no signs of chest pain, palpitations, rheumatic fever or heart murmur. RESPIRATORY:  Patient has no signs of chronic cough, wheezing, difficulty breathing, pain on breathing or shortness of breath. GASTROINTESTINAL:  Patient has no signs of nausea/vomiting, difficulty swallowing, gas/bloating, indigestion, abdominal pain, diarrhea, bloody stools or hemorrhoids. GENITOURINARY:  Patient has no signs of blood in urine, painful urinating, burning sensation, bladder/kidney infection, frequent urinating or incontinence. MUSCULOSKELETAL: Patient presents with joint stiffness and joint pain. Patient has no signs of swelling in feet. , fracture/dislocation, sprain/strain, tendonitis, rheumatoid disease or gout  INTEGUMENTARY:  Patient has no signs of rash/itching, psoriasis, Raynaud's phenomenon or varicose veins. EMOTIONAL:  Patient has no signs of anxiety, depression, bipolar disorder, memory loss or change in mood. Vitals:  Date BP Pulse Temp (F) Resp. (per min.) Height (Total in.) Weight (lbs.) BMI   09/01/2022     71.00  32.07   09/01/2022     71.00  32.08     Physical Examination:  Physical exam of the cervical spine shows generally good motion. Neck movement does not reproduce his symptoms. He has a normal neurologic exam of the upper extremities. The left shoulder shows limited overall motion reaching only 110 degrees of forward flexion and 80 degrees of abduction passively. He has weakness both in external rotation and abduction. He has severe pain with thumb-down abduction. He also has severe pain with a Speed's maneuver.     Impression:  Impingement, degenerative joint disease of the acromioclavicular joint, left shoulder, with rotator cuff tear and superior labrum anterior to posterior tear. Plan:  He will be scheduled for a left shoulder arthroscopic decompression, resection of distal clavicle, rotator cuff repair and SLAP repair versus biceps release. He understands the risks and benefits of the procedure, and he is ready to proceed. He has his own sling. Postop,  he will get Percocet. He will get medical clearance from his family physician.

## 2022-11-08 ENCOUNTER — HOSPITAL ENCOUNTER (OUTPATIENT)
Age: 62
Setting detail: OUTPATIENT SURGERY
Discharge: HOME | End: 2022-11-08
Attending: ORTHOPAEDIC SURGERY | Admitting: ORTHOPAEDIC SURGERY
Payer: COMMERCIAL

## 2022-11-08 ENCOUNTER — ANESTHESIA (OUTPATIENT)
Dept: SURGERY | Age: 62
End: 2022-11-08
Payer: COMMERCIAL

## 2022-11-08 VITALS
RESPIRATION RATE: 16 BRPM | HEIGHT: 71 IN | OXYGEN SATURATION: 95 % | WEIGHT: 222.8 LBS | HEART RATE: 64 BPM | DIASTOLIC BLOOD PRESSURE: 62 MMHG | SYSTOLIC BLOOD PRESSURE: 118 MMHG | TEMPERATURE: 97 F | BODY MASS INDEX: 31.19 KG/M2

## 2022-11-08 DIAGNOSIS — M75.102 ROTATOR CUFF SYNDROME OF LEFT SHOULDER: Primary | Chronic | ICD-10-CM

## 2022-11-08 LAB
ALBUMIN SERPL-MCNC: 3.8 G/DL (ref 3.4–5)
ALBUMIN/GLOB SERPL: 1.2 {RATIO} (ref 0.8–1.7)
ALP SERPL-CCNC: 43 U/L (ref 45–117)
ALT SERPL-CCNC: 22 U/L (ref 16–61)
ANION GAP SERPL CALC-SCNC: 7 MMOL/L (ref 3–18)
AST SERPL-CCNC: 15 U/L (ref 10–38)
BILIRUB SERPL-MCNC: 0.9 MG/DL (ref 0.2–1)
BUN SERPL-MCNC: 23 MG/DL (ref 7–18)
BUN/CREAT SERPL: 16 (ref 12–20)
CALCIUM SERPL-MCNC: 9.5 MG/DL (ref 8.5–10.1)
CHLORIDE SERPL-SCNC: 105 MMOL/L (ref 100–111)
CO2 SERPL-SCNC: 27 MMOL/L (ref 21–32)
CREAT SERPL-MCNC: 1.4 MG/DL (ref 0.6–1.3)
GLOBULIN SER CALC-MCNC: 3.1 G/DL (ref 2–4)
GLUCOSE BLD STRIP.AUTO-MCNC: 105 MG/DL (ref 70–110)
GLUCOSE BLD STRIP.AUTO-MCNC: 179 MG/DL (ref 70–110)
GLUCOSE BLD STRIP.AUTO-MCNC: 201 MG/DL (ref 70–110)
GLUCOSE SERPL-MCNC: 187 MG/DL (ref 74–99)
POTASSIUM SERPL-SCNC: 4.4 MMOL/L (ref 3.5–5.5)
PROT SERPL-MCNC: 6.9 G/DL (ref 6.4–8.2)
SODIUM SERPL-SCNC: 139 MMOL/L (ref 136–145)

## 2022-11-08 PROCEDURE — 74011250636 HC RX REV CODE- 250/636: Performed by: ORTHOPAEDIC SURGERY

## 2022-11-08 PROCEDURE — 76010000131 HC OR TIME 2 TO 2.5 HR: Performed by: ORTHOPAEDIC SURGERY

## 2022-11-08 PROCEDURE — 74011636637 HC RX REV CODE- 636/637: Performed by: ANESTHESIOLOGY

## 2022-11-08 PROCEDURE — 77030018835 HC SOL IRR LR ICUM -A: Performed by: ORTHOPAEDIC SURGERY

## 2022-11-08 PROCEDURE — 77030002960 HC SUT PASS S&N -C: Performed by: ORTHOPAEDIC SURGERY

## 2022-11-08 PROCEDURE — 74011000250 HC RX REV CODE- 250: Performed by: ORTHOPAEDIC SURGERY

## 2022-11-08 PROCEDURE — 74011250636 HC RX REV CODE- 250/636: Performed by: NURSE ANESTHETIST, CERTIFIED REGISTERED

## 2022-11-08 PROCEDURE — 77030002916 HC SUT ETHLN J&J -A: Performed by: ORTHOPAEDIC SURGERY

## 2022-11-08 PROCEDURE — 82962 GLUCOSE BLOOD TEST: CPT

## 2022-11-08 PROCEDURE — 77030006884 HC BLD SHV INCIS S&N -B: Performed by: ORTHOPAEDIC SURGERY

## 2022-11-08 PROCEDURE — 76060000035 HC ANESTHESIA 2 TO 2.5 HR: Performed by: ORTHOPAEDIC SURGERY

## 2022-11-08 PROCEDURE — 74011250636 HC RX REV CODE- 250/636: Performed by: ANESTHESIOLOGY

## 2022-11-08 PROCEDURE — 76942 ECHO GUIDE FOR BIOPSY: CPT | Performed by: ORTHOPAEDIC SURGERY

## 2022-11-08 PROCEDURE — 76210000021 HC REC RM PH II 0.5 TO 1 HR: Performed by: ORTHOPAEDIC SURGERY

## 2022-11-08 PROCEDURE — 64415 NJX AA&/STRD BRCH PLXS IMG: CPT | Performed by: ANESTHESIOLOGY

## 2022-11-08 PROCEDURE — 77030020782 HC GWN BAIR PAWS FLX 3M -B: Performed by: ORTHOPAEDIC SURGERY

## 2022-11-08 PROCEDURE — 77030040361 HC SLV COMPR DVT MDII -B: Performed by: ORTHOPAEDIC SURGERY

## 2022-11-08 PROCEDURE — 77030034478 HC TU IRR ARTHRO PT ARTH -B: Performed by: ORTHOPAEDIC SURGERY

## 2022-11-08 PROCEDURE — 80053 COMPREHEN METABOLIC PANEL: CPT

## 2022-11-08 PROCEDURE — 77030008477 HC STYL SATN SLP COVD -A: Performed by: ANESTHESIOLOGY

## 2022-11-08 PROCEDURE — 77030006643: Performed by: ANESTHESIOLOGY

## 2022-11-08 PROCEDURE — 74011000250 HC RX REV CODE- 250: Performed by: ANESTHESIOLOGY

## 2022-11-08 PROCEDURE — 77030012711 HC WND ARTHRO ABLT S&N -D: Performed by: ORTHOPAEDIC SURGERY

## 2022-11-08 PROCEDURE — 74011000250 HC RX REV CODE- 250: Performed by: NURSE ANESTHETIST, CERTIFIED REGISTERED

## 2022-11-08 PROCEDURE — 2709999900 HC NON-CHARGEABLE SUPPLY: Performed by: ORTHOPAEDIC SURGERY

## 2022-11-08 PROCEDURE — 77030004451 HC BUR SHV S&N -B: Performed by: ORTHOPAEDIC SURGERY

## 2022-11-08 PROCEDURE — 77030008683 HC TU ET CUF COVD -A: Performed by: ANESTHESIOLOGY

## 2022-11-08 PROCEDURE — C1713 ANCHOR/SCREW BN/BN,TIS/BN: HCPCS | Performed by: ORTHOPAEDIC SURGERY

## 2022-11-08 PROCEDURE — 36415 COLL VENOUS BLD VENIPUNCTURE: CPT

## 2022-11-08 PROCEDURE — 76210000006 HC OR PH I REC 0.5 TO 1 HR: Performed by: ORTHOPAEDIC SURGERY

## 2022-11-08 DEVICE — MULTIFIX S-ULTRA 5.5MM KNOTLESS ANCHOR
Type: IMPLANTABLE DEVICE | Site: SHOULDER | Status: FUNCTIONAL
Brand: MULTIFIX

## 2022-11-08 RX ORDER — SUCCINYLCHOLINE CHLORIDE 100 MG/5ML
SYRINGE (ML) INTRAVENOUS AS NEEDED
Status: DISCONTINUED | OUTPATIENT
Start: 2022-11-08 | End: 2022-11-08 | Stop reason: HOSPADM

## 2022-11-08 RX ORDER — SODIUM CHLORIDE 0.9 % (FLUSH) 0.9 %
5-40 SYRINGE (ML) INJECTION AS NEEDED
Status: DISCONTINUED | OUTPATIENT
Start: 2022-11-08 | End: 2022-11-08 | Stop reason: HOSPADM

## 2022-11-08 RX ORDER — FENTANYL CITRATE 50 UG/ML
25 INJECTION, SOLUTION INTRAMUSCULAR; INTRAVENOUS AS NEEDED
Status: DISCONTINUED | OUTPATIENT
Start: 2022-11-08 | End: 2022-11-08 | Stop reason: HOSPADM

## 2022-11-08 RX ORDER — LIDOCAINE HYDROCHLORIDE 20 MG/ML
INJECTION, SOLUTION EPIDURAL; INFILTRATION; INTRACAUDAL; PERINEURAL AS NEEDED
Status: DISCONTINUED | OUTPATIENT
Start: 2022-11-08 | End: 2022-11-08 | Stop reason: HOSPADM

## 2022-11-08 RX ORDER — DEXMEDETOMIDINE HYDROCHLORIDE 100 UG/ML
INJECTION, SOLUTION INTRAVENOUS
Status: COMPLETED | OUTPATIENT
Start: 2022-11-08 | End: 2022-11-08

## 2022-11-08 RX ORDER — PROPOFOL 10 MG/ML
INJECTION, EMULSION INTRAVENOUS AS NEEDED
Status: DISCONTINUED | OUTPATIENT
Start: 2022-11-08 | End: 2022-11-08 | Stop reason: HOSPADM

## 2022-11-08 RX ORDER — SODIUM CHLORIDE 0.9 % (FLUSH) 0.9 %
5-40 SYRINGE (ML) INJECTION EVERY 8 HOURS
Status: DISCONTINUED | OUTPATIENT
Start: 2022-11-08 | End: 2022-11-08 | Stop reason: HOSPADM

## 2022-11-08 RX ORDER — ROCURONIUM BROMIDE 10 MG/ML
INJECTION, SOLUTION INTRAVENOUS AS NEEDED
Status: DISCONTINUED | OUTPATIENT
Start: 2022-11-08 | End: 2022-11-08 | Stop reason: HOSPADM

## 2022-11-08 RX ORDER — MIDAZOLAM HYDROCHLORIDE 1 MG/ML
INJECTION, SOLUTION INTRAMUSCULAR; INTRAVENOUS
Status: COMPLETED
Start: 2022-11-08 | End: 2022-11-08

## 2022-11-08 RX ORDER — FENTANYL CITRATE 50 UG/ML
INJECTION, SOLUTION INTRAMUSCULAR; INTRAVENOUS AS NEEDED
Status: DISCONTINUED | OUTPATIENT
Start: 2022-11-08 | End: 2022-11-08 | Stop reason: HOSPADM

## 2022-11-08 RX ORDER — EPHEDRINE SULFATE/0.9% NACL/PF 50 MG/5 ML
SYRINGE (ML) INTRAVENOUS AS NEEDED
Status: DISCONTINUED | OUTPATIENT
Start: 2022-11-08 | End: 2022-11-08 | Stop reason: HOSPADM

## 2022-11-08 RX ORDER — CEFAZOLIN SODIUM/WATER 2 G/20 ML
2 SYRINGE (ML) INTRAVENOUS ONCE
Status: COMPLETED | OUTPATIENT
Start: 2022-11-08 | End: 2022-11-08

## 2022-11-08 RX ORDER — GLYCOPYRROLATE 0.2 MG/ML
INJECTION INTRAMUSCULAR; INTRAVENOUS AS NEEDED
Status: DISCONTINUED | OUTPATIENT
Start: 2022-11-08 | End: 2022-11-08 | Stop reason: HOSPADM

## 2022-11-08 RX ORDER — ROPIVACAINE HYDROCHLORIDE 5 MG/ML
INJECTION, SOLUTION EPIDURAL; INFILTRATION; PERINEURAL
Status: COMPLETED | OUTPATIENT
Start: 2022-11-08 | End: 2022-11-08

## 2022-11-08 RX ORDER — NALOXONE HYDROCHLORIDE 0.4 MG/ML
0.1 INJECTION, SOLUTION INTRAMUSCULAR; INTRAVENOUS; SUBCUTANEOUS AS NEEDED
Status: DISCONTINUED | OUTPATIENT
Start: 2022-11-08 | End: 2022-11-08 | Stop reason: HOSPADM

## 2022-11-08 RX ORDER — INSULIN LISPRO 100 [IU]/ML
INJECTION, SOLUTION INTRAVENOUS; SUBCUTANEOUS ONCE
Status: DISCONTINUED | OUTPATIENT
Start: 2022-11-08 | End: 2022-11-08 | Stop reason: HOSPADM

## 2022-11-08 RX ORDER — OXYCODONE AND ACETAMINOPHEN 5; 325 MG/1; MG/1
1 TABLET ORAL
Qty: 60 TABLET | Refills: 0 | Status: SHIPPED
Start: 2022-11-08 | End: 2022-11-15

## 2022-11-08 RX ORDER — MAGNESIUM SULFATE 100 %
4 CRYSTALS MISCELLANEOUS AS NEEDED
Status: DISCONTINUED | OUTPATIENT
Start: 2022-11-08 | End: 2022-11-08 | Stop reason: HOSPADM

## 2022-11-08 RX ORDER — FLUMAZENIL 0.1 MG/ML
0.2 INJECTION INTRAVENOUS
Status: DISCONTINUED | OUTPATIENT
Start: 2022-11-08 | End: 2022-11-08 | Stop reason: HOSPADM

## 2022-11-08 RX ORDER — SODIUM CHLORIDE, SODIUM LACTATE, POTASSIUM CHLORIDE, CALCIUM CHLORIDE 600; 310; 30; 20 MG/100ML; MG/100ML; MG/100ML; MG/100ML
125 INJECTION, SOLUTION INTRAVENOUS CONTINUOUS
Status: DISCONTINUED | OUTPATIENT
Start: 2022-11-08 | End: 2022-11-08 | Stop reason: HOSPADM

## 2022-11-08 RX ORDER — ONDANSETRON 2 MG/ML
INJECTION INTRAMUSCULAR; INTRAVENOUS AS NEEDED
Status: DISCONTINUED | OUTPATIENT
Start: 2022-11-08 | End: 2022-11-08 | Stop reason: HOSPADM

## 2022-11-08 RX ORDER — SODIUM CHLORIDE, SODIUM LACTATE, POTASSIUM CHLORIDE, CALCIUM CHLORIDE 600; 310; 30; 20 MG/100ML; MG/100ML; MG/100ML; MG/100ML
1000 INJECTION, SOLUTION INTRAVENOUS CONTINUOUS
Status: DISCONTINUED | OUTPATIENT
Start: 2022-11-08 | End: 2022-11-08 | Stop reason: HOSPADM

## 2022-11-08 RX ORDER — HYDROMORPHONE HYDROCHLORIDE 1 MG/ML
0.5 INJECTION, SOLUTION INTRAMUSCULAR; INTRAVENOUS; SUBCUTANEOUS
Status: DISCONTINUED | OUTPATIENT
Start: 2022-11-08 | End: 2022-11-08 | Stop reason: HOSPADM

## 2022-11-08 RX ORDER — MIDAZOLAM HYDROCHLORIDE 1 MG/ML
INJECTION, SOLUTION INTRAMUSCULAR; INTRAVENOUS
Status: COMPLETED | OUTPATIENT
Start: 2022-11-08 | End: 2022-11-08

## 2022-11-08 RX ORDER — BUPIVACAINE HYDROCHLORIDE AND EPINEPHRINE 5; 5 MG/ML; UG/ML
INJECTION, SOLUTION EPIDURAL; INTRACAUDAL; PERINEURAL AS NEEDED
Status: DISCONTINUED | OUTPATIENT
Start: 2022-11-08 | End: 2022-11-08 | Stop reason: HOSPADM

## 2022-11-08 RX ORDER — INSULIN LISPRO 100 [IU]/ML
INJECTION, SOLUTION INTRAVENOUS; SUBCUTANEOUS ONCE
Status: COMPLETED | OUTPATIENT
Start: 2022-11-08 | End: 2022-11-08

## 2022-11-08 RX ORDER — DEXTROSE MONOHYDRATE 100 MG/ML
0-250 INJECTION, SOLUTION INTRAVENOUS AS NEEDED
Status: DISCONTINUED | OUTPATIENT
Start: 2022-11-08 | End: 2022-11-08 | Stop reason: HOSPADM

## 2022-11-08 RX ADMIN — Medication 20 MG: at 11:46

## 2022-11-08 RX ADMIN — SODIUM CHLORIDE, SODIUM LACTATE, POTASSIUM CHLORIDE, AND CALCIUM CHLORIDE: 600; 310; 30; 20 INJECTION, SOLUTION INTRAVENOUS at 12:40

## 2022-11-08 RX ADMIN — GLYCOPYRROLATE 0.2 MG: 0.2 INJECTION INTRAMUSCULAR; INTRAVENOUS at 11:47

## 2022-11-08 RX ADMIN — Medication 100 MG: at 11:35

## 2022-11-08 RX ADMIN — MIDAZOLAM 2 MG: 1 INJECTION INTRAMUSCULAR; INTRAVENOUS at 11:15

## 2022-11-08 RX ADMIN — SODIUM CHLORIDE, SODIUM LACTATE, POTASSIUM CHLORIDE, AND CALCIUM CHLORIDE 125 ML/HR: 600; 310; 30; 20 INJECTION, SOLUTION INTRAVENOUS at 09:56

## 2022-11-08 RX ADMIN — ROPIVACAINE HYDROCHLORIDE 20 ML: 5 INJECTION, SOLUTION EPIDURAL; INFILTRATION; PERINEURAL at 11:15

## 2022-11-08 RX ADMIN — PROPOFOL 200 MG: 10 INJECTION, EMULSION INTRAVENOUS at 11:35

## 2022-11-08 RX ADMIN — ONDANSETRON HYDROCHLORIDE 4 MG: 2 INJECTION INTRAMUSCULAR; INTRAVENOUS at 11:47

## 2022-11-08 RX ADMIN — Medication 2 G: at 11:40

## 2022-11-08 RX ADMIN — FENTANYL CITRATE 50 MCG: 50 INJECTION, SOLUTION INTRAMUSCULAR; INTRAVENOUS at 11:34

## 2022-11-08 RX ADMIN — INSULIN LISPRO 4 UNITS: 100 INJECTION, SOLUTION INTRAVENOUS; SUBCUTANEOUS at 10:21

## 2022-11-08 RX ADMIN — Medication 10 MG: at 12:13

## 2022-11-08 RX ADMIN — DEXMEDETOMIDINE HYDROCHLORIDE 20 MCG: 100 INJECTION, SOLUTION INTRAVENOUS at 11:15

## 2022-11-08 RX ADMIN — ROCURONIUM BROMIDE 5 MG: 10 INJECTION, SOLUTION INTRAVENOUS at 11:34

## 2022-11-08 RX ADMIN — LIDOCAINE HYDROCHLORIDE 80 MG: 20 INJECTION, SOLUTION EPIDURAL; INFILTRATION; INTRACAUDAL; PERINEURAL at 11:35

## 2022-11-08 NOTE — INTERVAL H&P NOTE
Update History & Physical    The Patient's History and Physical of November 7, 2022 was reviewed with the patient and I examined the patient. There was no change. The surgical site was confirmed by the patient and me. Plan:  The risk, benefits, expected outcome, and alternative to the recommended procedure have been discussed with the patient. Patient understands and wants to proceed with the procedure.     Electronically signed by Kassandra Flor MD on 11/8/2022 at 10:46 AM

## 2022-11-08 NOTE — PERIOP NOTES
TRANSFER - IN REPORT:    Verbal report received from OR nurse and CRNA on Claudio Glory  being received from OR (unit) for routine progression of care      Report consisted of patients Situation, Background, Assessment and   Recommendations(SBAR). Information from the following report(s) SBAR, OR Summary, Procedure Summary, Intake/Output, MAR, and Cardiac Rhythm SR  was reviewed with the receiving nurse. Opportunity for questions and clarification was provided. Assessment completed upon patients arrival to unit and care assumed.

## 2022-11-08 NOTE — OP NOTES
PREOPERATIVE DIAGNOSES:    Rotator cuff tear, left shoulder  Impingement. Degenerative arthritis of the acromioclavicular joint. POSTOPERATIVE DIAGNOSES:    Rotator cuff tear, left shoulder  Impingement. Degenerative arthritis of the acromioclavicular joint. Biceps partial tear   Adhesive capsulitis    PROCEDURES PERFORMED:    Arthroscopic decompression. Resection distal clavicle. Single Row Rotator cuff repair, left shoulder. Biceps release  Manipulation under anesthesia    SURGEON:  Jazzy Anna. Lucía Mitchell MD    ANESTHESIOLOGIST:  Anesthesiologist: Joelle Stevens MD  CRNA: Becka Marcelino CRNA; Sabina Rios CRNA    ASSISTANTS: Circ-1: Leona Prabhakar RN  Circ-Relief: Matt Peter Tech-1: Malou Raymon  Surg Asst-1: Sung Shepard  Surg Asst-Relief: Julio César ROMAN    ANESTHESIA:   General anesthesia after scalene block. COMPLICATIONS:  None. BLOOD LOSS:  Minimal.      SPECIMENS: None    DESCRIPTION OF PROCEDURE:  This 65y.o.-year-old male, with a history of persistent pain in the left shoulder, unresponsive to conservative care. The patient had a MRI showing the above noted findings and was then scheduled for surgery. PROCEDURE:  The patient was taken to the operating room and given general anesthesia after a scalene block. When it was adequate, the left shoulder was examined and showed limited motion with no gross instability. The shoulder was gently manipulated into full forward flexion and rotatation with the audible and palpable release of adhesions. Repeat exam showed no instability. The patient was placed in a right lateral decubitus position. The left shoulder was placed in traction, prepped and draped in a normal manner and injected with 30 mL of 1% Marcaine with Epinephrine. Anterior and posterior stab wounds were made, and a standard arthroscopic examination was performed. This revealed a large tear of the supraspinatus. .  The biceps and the superior labrum were torn and unstable. The biceps was debrided and released and the labrum was debrided with electrocautery. The articular surfaces of the joint appeared normal.  The instruments were then redirected in the subacromial space. A lateral portal was established. The rotator cuff torn and retracted. Impingement signs were noted, including bursal fraying and degenerative changes in the coraco-acromial ligament. A limited bursectomy was carried out, debriding the abnormal bursa. The soft tissue was removed from the anterior acromion and distal clavicle, including the coracoacromial ligament. There was a sharp hooked appearance on the anterior acromion, and severe arthritic changes of the [facility] joint with complete loss of joint cartilage, and large inferior osteophytes. A high-speed bur was used to perform an acromioplasty, debriding the bone spur. The same level of resection was carried across the distal clavicle. The arthroscope was switched to the lateral portal to assure that adequate bone removal had been achieved, and the result was a flat acromion. The [facility] joint was approached directly through the anterior portal.  The most medial few millimeters of the acromion and the distal centimeter of the clavicle were removed arthroscopically. Bleeding points were treated with the electrocautery wand for     The original footprint was cleaned of soft tissue and a high-speed bur was used to create a bleeding surface. A single row Ultra-Tape technique was used for the repair. Two Ultra-Tape sutures were passed through the edge of the cuff in a vertical mattress technique. They were then secured at the footprint with two Multifix S 5.5mm PEEK anchors resulting in a solid repair. The instruments were removed. The wounds were closed using 3-0 nylon suture. A sterile compressive dressing was applied, along with a sling. The patient left the operating room in satisfactory condition.

## 2022-11-08 NOTE — PERIOP NOTES
Patient transferred to chair, patient has call bell and family called to sit with patient.  Patient is tolerating PO fluids and crackers

## 2022-11-08 NOTE — ANESTHESIA PREPROCEDURE EVALUATION
Relevant Problems   No relevant active problems       Anesthetic History   No history of anesthetic complications            Review of Systems / Medical History  Patient summary reviewed, nursing notes reviewed and pertinent labs reviewed    Pulmonary                Comments: H/o histoplasmosis   Neuro/Psych   Within defined limits           Cardiovascular    Hypertension          Hyperlipidemia    Exercise tolerance: >4 METS     GI/Hepatic/Renal  Within defined limits       Renal disease    Pertinent negatives: No hiatal hernia and GERD  Comments: Renal agenesis and creatnine elevated Endo/Other    Diabetes    Obesity     Other Findings            Physical Exam    Airway  Mallampati: III  TM Distance: 4 - 6 cm  Neck ROM: normal range of motion   Mouth opening: Normal     Cardiovascular               Dental  No notable dental hx       Pulmonary                 Abdominal         Other Findings            Anesthetic Plan    ASA: 3  Anesthesia type: general and regional - interscalene block          Induction: Intravenous  Anesthetic plan and risks discussed with: Patient      Risk of a block include nerve injury, bleeding, infection, and failure as the most common ones although they rare.

## 2022-11-08 NOTE — PERIOP NOTES
Dr. Patty Schaeffer notified of patient's FBS: 201, Orders for insulin and to keep fluids open ordered at this time.

## 2022-11-08 NOTE — ANESTHESIA PROCEDURE NOTES
Peripheral Block    Start time: 11/8/2022 11:20 AM  End time: 11/8/2022 12:20 PM  Performed by: Joelle Stevens MD  Authorized by: Joelle Stevens MD       Pre-procedure: Indications: at surgeon's request and procedure for pain    Preanesthetic Checklist: patient identified, risks and benefits discussed, site marked, timeout performed, anesthesia consent given, patient being monitored and fire risk safety assessment completed and verbalized    Timeout Time: 11:20 EST      Block Type:   Block Type:   Interscalene  Laterality:  Left  Monitoring:  Standard ASA monitoring, continuous pulse ox, frequent vital sign checks, heart rate, responsive to questions and oxygen  Injection Technique:  Single shot  Procedures: ultrasound guided    Patient Position: seated  Prep: chlorhexidine    Location:  Interscalene  Needle Type:  Stimuplex  Needle Gauge:  22 G  Needle Localization:  Anatomical landmarks and ultrasound guidance  Motor Response comment:   Motor Response: minimal motor response >0.4 mA    Medication Injected:  Midazolam (VERSED) injection - IntraVENous   2 mg - 11/8/2022 11:15:00 AM  dexmedeTOMidine (PRECEDEX) 100 mcg/mL iv solution - Peripheral Nerve Block   20 mcg - 11/8/2022 11:15:00 AM  ropivacaine (PF) (NAROPIN) 5 mg/mL (0.5 %) injection - Peripheral Nerve Block   20 mL - 11/8/2022 11:15:00 AM  Med Admin Time: 11/8/2022 11:15 AM    Assessment:  Number of attempts:  1  Injection Assessment:  Incremental injection every 5 mL, local visualized surrounding nerve on ultrasound, negative aspiration for CSF, negative aspiration for blood, no paresthesia, no intravascular symptoms and ultrasound image on chart  Patient tolerance:  Patient tolerated the procedure well with no immediate complications

## 2022-11-08 NOTE — ANESTHESIA POSTPROCEDURE EVALUATION
Procedure(s):  LEFT SHOULDER ARTHROSCOPIC DECOMPRESSION, RESECTION DISTAL CLAVICLE ROTATOR CUFF REPAIR, BICEPS RELEASE, MANIPULATION UNDER ANESTHESIA  **SPEC POP**.    general, regional    Anesthesia Post Evaluation        Comments: Post-Anesthesia Evaluation and Assessment    Cardiovascular Function/Vital Signs  /68   Pulse (!) 58   Temp 36.3 °C (97.3 °F)   Resp 18   Ht 5' 11\" (1.803 m)   Wt 101.1 kg (222 lb 12.8 oz)   SpO2 98%   BMI 31.07 kg/m²     Patient is status post Procedure(s):  LEFT SHOULDER ARTHROSCOPIC DECOMPRESSION, RESECTION DISTAL CLAVICLE ROTATOR CUFF REPAIR, BICEPS RELEASE, MANIPULATION UNDER ANESTHESIA  **SPEC POP**. Nausea/Vomiting: Controlled. Postoperative hydration reviewed and adequate. Pain:  Pain Scale 1: Visual (11/08/22 1342)  Pain Intensity 1: 0 (11/08/22 1342)   Managed. Neurological Status:   Neuro (WDL): Exceptions to WDL (11/08/22 1342)   At baseline. Mental Status and Level of Consciousness: Arousable. Pulmonary Status:   O2 Device: Nasal cannula (11/08/22 1347)   Adequate oxygenation and airway patent. Complications related to anesthesia: None    Post-anesthesia assessment completed. No concerns. Patient has met all discharge requirements. Signed By: Estrella Bailey MD    November 8, 2022                   INITIAL Post-op Vital signs:   Vitals Value Taken Time   /60 11/08/22 1411   Temp 36.3 °C (97.3 °F) 11/08/22 1344   Pulse 59 11/08/22 1414   Resp 18 11/08/22 1414   SpO2 98 % 11/08/22 1414   Vitals shown include unvalidated device data.

## 2022-11-08 NOTE — DISCHARGE INSTRUCTIONS
Follow ALL directions given in pre-op appointment  Call Dr. Nba Perez with all questions or concerns      DISCHARGE SUMMARY from Nurse    PATIENT INSTRUCTIONS:    After general anesthesia or intravenous sedation, for 24 hours or while taking prescription Narcotics:  Limit your activities  Do not drive and operate hazardous machinery  Do not make important personal or business decisions  Do  not drink alcoholic beverages  If you have not urinated within 8 hours after discharge, please contact your surgeon on call. Report the following to your surgeon:  Excessive pain, swelling, redness or odor of or around the surgical area  Temperature over 100.5  Nausea and vomiting lasting longer than 4 hours or if unable to take medications  Any signs of decreased circulation or nerve impairment to extremity: change in color, persistent  numbness, tingling, coldness or increase pain  Any questions    What to do at Home:  Recommended activity: Ambulate in house, do not drive or lift anything heavy until advised    If you experience any of the following symptoms uncontrolled pain, nausea, vomiting, fingers turning, blue or feeling numb, any questions or concerns please follow up with Dr. Nba Perez. *  Please give a list of your current medications to your Primary Care Provider. *  Please update this list whenever your medications are discontinued, doses are      changed, or new medications (including over-the-counter products) are added. *  Please carry medication information at all times in case of emergency situations. These are general instructions for a healthy lifestyle:    No smoking/ No tobacco products/ Avoid exposure to second hand smoke  Surgeon General's Warning:  Quitting smoking now greatly reduces serious risk to your health.     Obesity, smoking, and sedentary lifestyle greatly increases your risk for illness    A healthy diet, regular physical exercise & weight monitoring are important for maintaining a healthy lifestyle    You may be retaining fluid if you have a history of heart failure or if you experience any of the following symptoms:  Weight gain of 3 pounds or more overnight or 5 pounds in a week, increased swelling in our hands or feet or shortness of breath while lying flat in bed. Please call your doctor as soon as you notice any of these symptoms; do not wait until your next office visit. Patient armband removed and shredded     The discharge information has been reviewed with the patient and caregiver. The patient and caregiver verbalized understanding. Discharge medications reviewed with the patient and caregiver and appropriate educational materials and side effects teaching were provided.   ___________________________________________________________________________________________________________________________________

## 2022-11-08 NOTE — PERIOP NOTES
TRANSFER - IN REPORT:    Verbal report received from [id number] Campbell RN(name) on Rony Prieto  being received from PACU(unit) for routine progression of care      Report consisted of patients Situation, Background, Assessment and   Recommendations(SBAR). Information from the following report(s) SBAR, Kardex, and MAR was reviewed with the receiving nurse. Opportunity for questions and clarification was provided. Assessment completed upon patients arrival to unit and care assumed.

## 2022-11-08 NOTE — PERIOP NOTES
Reviewed PTA medication list with patient/caregiver and patient/caregiver denies any additional medications. Patient admits to having a responsible adult care for them at home for at least 24 hours after surgery. Patient encouraged to use gown warming system and informed that using said warming gown to regulate body temperature prior to a procedure has been shown to help reduce the risks of blood clots and infection. Patient's pharmacy of choice verified and documented in PTA medication section. Dual skin assessment & fall risk band verification completed with Jennifer Elizondo RN.

## 2022-11-08 NOTE — NURSE NAVIGATOR
Spinal cord stimulator turned to off position with assistance from [employer] foster. Call if further assistance needed post op.  ([phone])

## 2023-02-07 ENCOUNTER — HOSPITAL ENCOUNTER (OUTPATIENT)
Dept: PHYSICAL THERAPY | Age: 63
Discharge: HOME | End: 2023-02-07
Payer: COMMERCIAL

## 2023-02-07 PROCEDURE — 97110 THERAPEUTIC EXERCISES: CPT

## 2023-02-07 PROCEDURE — 97162 PT EVAL MOD COMPLEX 30 MIN: CPT

## 2023-02-07 NOTE — PROGRESS NOTES
PT DAILY TREATMENT NOTE     Patient Name: Adam Doshi  Date:2023  : 1960  [x]  Patient  Verified  Payor: CLINT / Plan: Rosendo Angel 74 / Product Type:  /    In time: 2:01  Out time:2:35  Total Treatment Time (min): 34  Visit #: 1 of 10    Treatment Area: Pain in left shoulder [M25.512]    SUBJECTIVE  Pain Level (0-10 scale): 6  Any medication changes, allergies to medications, adverse drug reactions, diagnosis change, or new procedure performed?: [x] No    [] Yes (see summary sheet for update)  Subjective functional status/changes:   [] No changes reported  See POC    OBJECTIVE    14 min [x]Eval                  []Re-Eval       10 min Therapeutic Exercise:  [] See flow sheet : HEP instruction and demonstration    Rationale: increase ROM and increase strength to improve the patients ability to perform ADLs. 10 min Self Care/Home Management: []  See flow sheet : pt education regarding anatomy and physiology of the UEs and how it relates to the pt's condition, pt education on using ice mins as needed to decrease pain/symptoms, pt education on avoiding activities that increase his pain/symptoms and continue to follow physician restrictions. Rationale: increase ROM, increase strength and decrease pain/symptoms  to improve the patients ability to tolerate functional tasks. With   [x] TE   [] TA   [] neuro   [x] Other: Self Care/Home management Patient Education: [x] Review HEP    [] Progressed/Changed HEP based on:   [] positioning   [] body mechanics   [] transfers   [] heat/ice application    [] other:      Other Objective/Functional Measures: See evaluation. Pain Level (0-10 scale) post treatment: 5    ASSESSMENT/Changes in Function: Pt given HEP handout to perform. Pt understood exercises in HEP handout. Pt demonstrated decreased AROM, decreased strength, muscle tightness, tenderness to palpation.  Pt would benefit from physical therapy to improve the above impairments to help the pt return to performing ADLs, functional and work activities. Patient will continue to benefit from skilled PT services to modify and progress therapeutic interventions, address functional mobility deficits, address ROM deficits, address strength deficits, analyze and address soft tissue restrictions, analyze and cue movement patterns, analyze and modify body mechanics/ergonomics, assess and modify postural abnormalities, and instruct in home and community integration to attain remaining goals. [x]  See Plan of Care  []  See progress note/recertification  []  See Discharge Summary         Progress towards goals / Updated goals:  Short Term Goals: To be accomplished in 2 treatments:  1. Pt will report compliance and independence to HEP to help the pt manage their pain and symptoms. Eval: established   Long Term Goals: To be accomplished in 10 treatments:  1. Pt will increase FOTO score to 61 points to improve ability to perform ADLs. Eval: 36 points  2. Pt will increase AROM left shoulder flex/ABD to 140 degs to improve ability to reach with less pain. Eval: flex/ degs  3. Pt will increase AROM left shoulder ER to 60 degs to improve ability to tolerate daily activities. Eval: 47 degs  4. Pt will increase functional IR left UE to L5 to improve ease of dressing. Eval: back left pant pocket  5. Pt will report having a little difficulty with reaching a shelf that is at shoulder height with the left UE to improve ability to perform household activities.    Eval: much difficulty per FOTO    PLAN  [x]  Upgrade activities as tolerated     [x]  Continue plan of care  [x]  Update interventions per flow sheet       []  Discharge due to:_  []  Other:_      Deon Licea PT 2/7/2023  2:44 PM    Future Appointments   Date Time Provider Antonia Joy   2/7/2023  2:00 PM Bhavin Graham, PT [facility]

## 2023-02-07 NOTE — PROGRESS NOTES
[facility]  [address] [facility] [address]  [place], [address]  [phone] fax    Plan of Care/ Statement of Necessity for Physical Therapy Services  Patient name: Gay Berman Start of Care: 2023   Referral source: Zoltan Kemp MD : 1960    Medical Diagnosis: Pain in left shoulder [M25.512]  Payor:  / Plan: Rosendo Angel 74 / Product Type: Isacc Santiago /  Onset Date: DOS 2022    Treatment Diagnosis: left shoulder pain   Prior Hospitalization: see medical history Provider#: [id number]   Medications: Verified on Patient summary List    Comorbidities: DM, HTN, spinal cord stimulator may 2022   Prior Level of Function: Independent with ADLs, functional, and work activities with pain prior to the surgery. The Plan of Care and following information is based on the information from the initial evaluation. Assessment/ key information:   Pt is a 65year old male who presents to therapy today with left shoulder pain s/p decompression, resection distal clavicle, RCR, biceps release, manipulation under anesthesia DOS 2022. Pt states that the initial injury occurred in the summer of  from a fall. Pt reports having pain with reaching overhead/behind her back, and with gripping. Pt states he has some numbness in the left UE from the nerve block. Pt demonstrated decreased AROM, decreased strength, muscle tightness, tenderness to palpation. Pt would benefit from physical therapy to improve the above impairments to help the pt return to performing ADLs, functional and work activities.      Evaluation Complexity History HIGH Complexity :3+ comorbidities / personal factors will impact the outcome/ POC ; Examination MEDIUM Complexity : 3 Standardized tests and measures addressing body structure, function, activity limitation and / or participation in recreation  ;Presentation MEDIUM Complexity : Evolving with changing characteristics ;Clinical Decision Making MEDIUM Complexity : FOTO score of 26-74  Overall Complexity Rating: MEDIUM  Problem List: pain affecting function, decrease ROM, decrease strength, edema affecting function, decrease ADL/ functional abilitiies, decrease activity tolerance, decrease flexibility/ joint mobility, and decrease transfer abilities   Treatment Plan may include any combination of the following: Therapeutic exercise, Neuromuscular reeducation, Manual therapy, Therapeutic activity, Self care/home management, Vasopneumatic device, and Ultrasound  Patient / Family readiness to learn indicated by: asking questions, trying to perform skills, and interest  Persons(s) to be included in education: patient (P)  Barriers to Learning/Limitations: None  Patient Goal (s): increase range of motion  Patient Self Reported Health Status: fair  Rehabilitation Potential: good    Short Term Goals: To be accomplished in 2 treatments:  1. Pt will report compliance and independence to HEP to help the pt manage their pain and symptoms. Eval: established   Long Term Goals: To be accomplished in 10 treatments:  1. Pt will increase FOTO score to 61 points to improve ability to perform ADLs. Eval: 36 points  2. Pt will increase AROM left shoulder flex/ABD to 140 degs to improve ability to reach with less pain. Eval: flex/ degs  3. Pt will increase AROM left shoulder ER to 60 degs to improve ability to tolerate daily activities. Eval: 47 degs  4. Pt will increase functional IR left UE to L5 to improve ease of dressing. Eval: back left pant pocket  5. Pt will report having a little difficulty with reaching a shelf that is at shoulder height with the left UE to improve ability to perform household activities. Eval: much difficulty per FOTO    Frequency / Duration: Patient to be seen 2-3 times per week for 10 treatments.     Patient/ Caregiver education and instruction: Diagnosis, prognosis, self care, activity modification, and exercises   [x]  Plan of care has been reviewed with LATOYA Gatica, PT 2/7/2023 2:43 PM  _____________________________________________________________________  I certify that the above Therapy Services are being furnished while the patient is under my care. I agree with the treatment plan and certify that this therapy is necessary.     Physician's Signature:____________________  Date:__________Time:______    Please sign and return to [facility]  [address]  [place], [address]  [phone] fax

## 2023-02-08 ENCOUNTER — HOSPITAL ENCOUNTER (OUTPATIENT)
Dept: PHYSICAL THERAPY | Age: 63
Discharge: HOME | End: 2023-02-08
Payer: COMMERCIAL

## 2023-02-08 PROCEDURE — 97016 VASOPNEUMATIC DEVICE THERAPY: CPT

## 2023-02-08 PROCEDURE — 97110 THERAPEUTIC EXERCISES: CPT

## 2023-02-08 PROCEDURE — 97140 MANUAL THERAPY 1/> REGIONS: CPT

## 2023-02-08 NOTE — PROGRESS NOTES
PT DAILY TREATMENT NOTE     Patient Name: Slime De La Rosa  Date:2023  : 1960  [x]  Patient  Verified  Payor: BLUE CROSS / Plan: [address] [place] [address] / Product Type: PPO /    In time:1:32  Out time:2:16  Total Treatment Time (min): 44  Visit #: 2 of 10    Medicare/BCBS Only   Total Timed Codes (min):  34 1:1 Treatment Time:  34       Treatment Area: Pain in left shoulder [M25.512]    SUBJECTIVE  Pain Level (0-10 scale): 3-4/10  Any medication changes, allergies to medications, adverse drug reactions, diagnosis change, or new procedure performed?: [x] No    [] Yes (see summary sheet for update)  Subjective functional status/changes:   [] No changes reported  The patient states that his left shoulder is sore today, he was just here yesterday and has not had a chance to perform HEP, but plans to tomorrow. OBJECTIVE  Modality rationale: decrease edema, decrease inflammation, and decrease pain to improve the patients ability to improve ADL ease. Min Type Additional Details   10 [x]  Vasopneumatic Device    [x]  Right     []  Left  Pre-treatment girth:  Post-treatment girth:  Measured at (location):  Pressure:       [x] lo [] med [] hi   Temperature: [x] lo [] med [] hi   [] Skin assessment post-treatment:  []intact []redness- no adverse reaction    []redness - adverse reaction:     14 min Therapeutic Exercise:  [x] See flow sheet :   Rationale: increase ROM and increase strength to improve the patients ability to improve ADL ease. 12 min Neuromuscular Re-education:  [x]  See flow sheet :   Rationale: increase ROM and increase strength  to improve the patients ability to improve ADL ease. 8 min Manual Therapy:  Left GHJ inferior/posterior capsular mobs grade II with the patient in supine through flexion ABD/ER/flexion   The manual therapy interventions were performed at a separate and distinct time from the therapeutic activities interventions.   Rationale: decrease pain, increase ROM, and increase tissue extensibility to improve ADL ease. With   [] TE   [] TA   [] neuro   [] other: Patient Education: [x] Review HEP    [] Progressed/Changed HEP based on:   [] positioning   [] body mechanics   [] transfers   [] heat/ice application    [] other:      Other Objective/Functional Measures:      Pain Level (0-10 scale) post treatment: 0/10    ASSESSMENT/Changes in Function: Today's emphasis was AAROM interventions and focusing on capsular mobility. The patient did have soreness of his left shoulder through session, though was able to keep this manageable, but required cues to perform stretching and AAROM interventions to tolerance. He completed session with moderate increase in pain. Patient will continue to benefit from skilled PT services to modify and progress therapeutic interventions, address functional mobility deficits, address ROM deficits, address strength deficits, analyze and address soft tissue restrictions, analyze and cue movement patterns, analyze and modify body mechanics/ergonomics, assess and modify postural abnormalities, and instruct in home and community integration to attain remaining goals. []  See Plan of Care  []  See progress note/recertification  []  See Discharge Summary         Progress towards goals / Updated goals:  Short Term Goals: To be accomplished in 2 treatments:  1. Pt will report compliance and independence to HEP to help the pt manage their pain and symptoms. Eval: established   Current: Not yet completed 2/08/2023  Long Term Goals: To be accomplished in 10 treatments:  1. Pt will increase FOTO score to 61 points to improve ability to perform ADLs. Eval: 36 points  2. Pt will increase AROM left shoulder flex/ABD to 140 degs to improve ability to reach with less pain. Eval: flex/ degs  3. Pt will increase AROM left shoulder ER to 60 degs to improve ability to tolerate daily activities. Eval: 47 degs  4.  Pt will increase functional IR left UE to L5 to improve ease of dressing. Eval: back left pant pocket  5. Pt will report having a little difficulty with reaching a shelf that is at shoulder height with the left UE to improve ability to perform household activities.    Eval: much difficulty per FOTO    PLAN  [x]  Upgrade activities as tolerated     []  Continue plan of care  []  Update interventions per flow sheet       []  Discharge due to:_  []  Other:_      Candie Glez PT 2/8/2023  1:48 PM    Future Appointments   Date Time Provider Antonia Joy   2/10/2023  3:30 PM Siva Ohara, PTA [facility]

## 2023-02-10 ENCOUNTER — HOSPITAL ENCOUNTER (OUTPATIENT)
Dept: PHYSICAL THERAPY | Age: 63
Discharge: HOME | End: 2023-02-10
Payer: COMMERCIAL

## 2023-02-10 PROCEDURE — 97110 THERAPEUTIC EXERCISES: CPT

## 2023-02-10 PROCEDURE — 97140 MANUAL THERAPY 1/> REGIONS: CPT

## 2023-02-10 NOTE — PROGRESS NOTES
PT DAILY TREATMENT NOTE     Patient Name: Nathan Parish  Date:2/10/2023  : 1960  [x]  Patient  Verified  Payor: SUSANNAH Coco Controller / Plan: [address] [place] [address] / Product Type: PPO /    In time:322  Out time:407  Total Treatment Time (min): 45  Visit #: 3 of 10    Medicare/BCBS Only   Total Timed Codes (min):  35 1:1 Treatment Time:  35       Treatment Area: Pain in left shoulder [M25.512]    SUBJECTIVE  Pain Level (0-10 scale): 5 \"sore\"  Any medication changes, allergies to medications, adverse drug reactions, diagnosis change, or new procedure performed?: [x] No    [] Yes (see summary sheet for update)  Subjective functional status/changes:   [] No changes reported  Patient reports some soreness today but states that it decreased after competing his HEP this morning. OBJECTIVE    Modality rationale: decrease edema, decrease inflammation, and decrease pain to improve the patients ability to tolerate ADLs.     Min Type Additional Details    [] Estim:  []Unatt       []IFC  []Premod                        []Other:  []w/ice   []w/heat  Position:  Location:    [] Estim: []Att    []TENS instruct  []NMES                    []Other:  []w/US   []w/ice   []w/heat  Position:  Location:    []  Traction: [] Cervical       []Lumbar                       [] Prone          []Supine                       []Intermittent   []Continuous Lbs:  [] before manual  [] after manual    []  Ultrasound: []Continuous   [] Pulsed                           []1MHz   []3MHz W/cm2:  Location:    []  Iontophoresis with dexamethasone         Location: [] Take home patch   [] In clinic    []  Ice     []  heat  []  Ice massage  []  Laser   []  Anodyne Position:  Location:    []  Laser with stim  []  Other:  Position:  Location:   10 [x]  Vasopneumatic Device    [x]  Right     []  Left  Pre-treatment girth:  Post-treatment girth:  Measured at (location):  Pressure:       [x] lo [] med [] hi   Temperature: [x] lo [] med [] hi   [x] Skin assessment post-treatment:  [x]intact [x]redness- no adverse reaction    []redness - adverse reaction:         27 min Therapeutic Exercise:  [x] See flow sheet :   Rationale: increase ROM and increase strength to improve the patients ability to complete ADLs with ease. 8 min Manual Therapy:    -right S/L: scapular clocks in all directions, STM/TPR to left UT/LS, left rhomboids  -supine: Grade 1-2 GH posterior and inferior joint mobs through flexion/ABD/ER   The manual therapy interventions were performed at a separate and distinct time from the therapeutic activities interventions. Rationale: decrease pain, increase ROM, increase tissue extensibility, and decrease trigger points to improve functional mobility. With   [] TE   [] TA   [] neuro   [] other: Patient Education: [x] Review HEP    [] Progressed/Changed HEP based on:   [] positioning   [] body mechanics   [] transfers   [] heat/ice application    [] other:      Other Objective/Functional Measures:   -added scapular retractions     Pain Level (0-10 scale) post treatment: 0    ASSESSMENT/Changes in Function: Patient with report of increased discomfort during doorway stretch; lowered hand placement helped to decrease discomfort, however, patient questioned effectiveness of stretch at this height. Plan to place hands slightly higher at NV. Scapular retractions added; patient required VC to decrease UT compensation. Plan to communicate with PT regarding exercise progression at NV. Patient will continue to benefit from skilled PT services to modify and progress therapeutic interventions, address functional mobility deficits, address ROM deficits, address strength deficits, analyze and address soft tissue restrictions, analyze and cue movement patterns, analyze and modify body mechanics/ergonomics, assess and modify postural abnormalities, and instruct in home and community integration to attain remaining goals.      []  See Plan of Care  []  See progress note/recertification  []  See Discharge Summary         Progress towards goals / Updated goals:  Short Term Goals: To be accomplished in 2 treatments:  1. Pt will report compliance and independence to HEP to help the pt manage their pain and symptoms. Eval: established   Current: Patient reports initiation 2/08/2023  Long Term Goals: To be accomplished in 10 treatments:  1. Pt will increase FOTO score to 61 points to improve ability to perform ADLs. Eval: 36 points  2. Pt will increase AROM left shoulder flex/ABD to 140 degs to improve ability to reach with less pain. Eval: flex/ degs  3. Pt will increase AROM left shoulder ER to 60 degs to improve ability to tolerate daily activities. Eval: 47 degs  4. Pt will increase functional IR left UE to L5 to improve ease of dressing. Eval: back left pant pocket  5. Pt will report having a little difficulty with reaching a shelf that is at shoulder height with the left UE to improve ability to perform household activities.    Eval: much difficulty per FOTO    PLAN  []  Upgrade activities as tolerated     []  Continue plan of care  []  Update interventions per flow sheet       []  Discharge due to:_  []  Other:_      Marsha Doe PTA 2/10/2023  11:40 AM    Future Appointments   Date Time Provider Antonia Joy   2/10/2023  3:30 PM Renée Ohara PTA [facility]

## 2023-02-15 ENCOUNTER — HOSPITAL ENCOUNTER (OUTPATIENT)
Facility: HOSPITAL | Age: 63
Setting detail: RECURRING SERIES
Discharge: HOME | End: 2023-02-18
Payer: COMMERCIAL

## 2023-02-15 PROCEDURE — 97140 MANUAL THERAPY 1/> REGIONS: CPT

## 2023-02-15 PROCEDURE — 97110 THERAPEUTIC EXERCISES: CPT

## 2023-02-15 NOTE — PROGRESS NOTES
PHYSICAL / OCCUPATIONAL THERAPY - DAILY TREATMENT NOTE (updated )    Patient Name: Evonne Post    Date: 2/15/2023    : 1960  Insurance: Payor: Rina Heredia / Plan: Eliot [employer] [place] [employer] / Product Type: *No Product type* /      Patient  verified Yes     Visit #   Current / Total 5 10   Time   In / Out 1:00 1:36   Pain   In / Out 4/10 6-7/10   Subjective Functional Status/Changes: No new complaints. Changes to:  Meds, Allergies, Med Hx, Sx Hx? If yes, update Summary List no       TREATMENT AREA =  Pain in left shoulder [M25.512]    OBJECTIVE    Therapeutic Procedures: Tx Min Billable or 1:1 Min (if diff from Tx Min) Procedure, Rationale, Specifics   [id number] [phone] [id number] Therapeutic Exercise (timed):  increase ROM, strength, coordination, balance, and proprioception to improve patient's ability to progress to PLOF and address remaining functional goals. (see flow sheet as applicable)     Details if applicable:       [phone] Manual Therapy (timed):  decrease pain, increase ROM, increase tissue extensibility, and decrease trigger points to improve patient's ability to progress to PLOF and address remaining functional goals. The manual therapy interventions were performed at a separate and distinct time from the therapeutic activities interventions . (see flow sheet as applicable)     Details if applicable:  Left ST joint clocks and STM to Left UT, lev scap and rhomboids. Pt right side-lying. Left GH joint inf/post mobs grade I-II. Gentle massage to Left bicep mm belly. PROM Left shoulder flex, scap and ER. Pt supine.    [phone] Totals Reminder: bill using total billable min of TIMED therapeutic procedures (example: do not include dry needle or estim unattended, both untimed codes, in totals to left)  8-22 min = 1 unit; 23-37 min = 2 units; 38-52 min = 3 units; 53-67 min = 4 units; 68-82 min = 5 units   Total Total     [x]  Patient Education billed concurrently with other procedures   [x] Review HEP [] Progressed/Changed HEP, detail:    [] Other detail:       Objective Information/Functional Measures/Assessment    AROM Left shoulder flexion 120 degrees, abd 115 degrees. Added isometrics abd, ER and IR. Added isometrics abd, ER and IR for 5x5\" Pt declined modalities post-treatment. Reported pain level increased to 6-7/10 after performing isometrics. Continue PT per protocol to increase Left shoulder mobility and strength to improve ease with performing functional activities. Patient will continue to benefit from skilled PT / OT services to modify and progress therapeutic interventions, analyze and address functional mobility deficits, analyze and address ROM deficits, analyze and address strength deficits, analyze and address soft tissue restrictions, and analyze and cue for proper movement patterns to address functional deficits and attain remaining goals. Progress toward goals / Updated goals:  []  See Progress Note/Recertification    Short Term Goals: To be accomplished in 2 treatments:  1. Pt will report compliance and independence to HEP to help the pt manage their pain and symptoms. Eval: established   Current: Patient reports initiation 2/08/2023  Long Term Goals: To be accomplished in 10 treatments:  1. Pt will increase FOTO score to 61 points to improve ability to perform ADLs. Eval: 36 points  2. Pt will increase AROM left shoulder flex/ABD to 140 degs to improve ability to reach with less pain. Eval: flex/ degs  Current: AROM Left shoulder flexion 120 degrees, abd 115 degrees. 2/15/2023  3. Pt will increase AROM left shoulder ER to 60 degs to improve ability to tolerate daily activities. Eval: 47 degs  4. Pt will increase functional IR left UE to L5 to improve ease of dressing. Eval: back left pant pocket  5. Pt will report having a little difficulty with reaching a shelf that is at shoulder height with the left UE to improve ability to perform household activities. Eval: much difficulty per FOTO    PLAN  Yes  Continue plan of care  []  Upgrade activities as tolerated  []  Discharge due to :  []  Other:    Cassy Howard PTA    2/15/2023    1:29 PM    Future Appointments   Date Time Provider Aden Hemphill   2/17/2023  1:30 PM Cassy Howard PTA [facility]   2/20/2023  1:00 PM Sola Martinez, SANAZ [facility]   2/22/2023  1:00 PM Vee Mobley, PT [facility]   2/24/2023  1:00 PM Ame Irving, XOCHILT [facility]

## 2023-02-20 ENCOUNTER — HOSPITAL ENCOUNTER (OUTPATIENT)
Facility: HOSPITAL | Age: 63
Setting detail: RECURRING SERIES
Discharge: HOME | End: 2023-02-23
Payer: COMMERCIAL

## 2023-02-20 PROCEDURE — 97110 THERAPEUTIC EXERCISES: CPT

## 2023-02-20 PROCEDURE — 97140 MANUAL THERAPY 1/> REGIONS: CPT

## 2023-02-20 NOTE — PROGRESS NOTES
PHYSICAL / OCCUPATIONAL THERAPY - DAILY TREATMENT NOTE (updated )    Patient Name: Jagdeep Blank    Date: 2023    : 1960  Insurance: Payor: Estephania Salinas [employer] / Plan: [facility] [place] [facility] / Product Type: *No Product type* /      Patient  verified Yes     Visit #   Current / Total 6 10   Time   In / Out 1:01 1:37   Pain   In / Out 0 0   Subjective Functional Status/Changes: Pt reports his mobility in the left shoulder is getting better but he is still having a lot of pain. Changes to:  Meds, Allergies, Med Hx, Sx Hx? If yes, update Summary List no       TREATMENT AREA =  Pain in left shoulder [M25.512]    OBJECTIVE      Therapeutic Procedures: Tx Min Billable or 1:1 Min (if diff from Tx Min) Procedure, Rationale, Specifics   28 23 11535 Therapeutic Exercise (timed):  increase ROM, strength, coordination, balance, and proprioception to improve patient's ability to progress to PLOF and address remaining functional goals. (see flow sheet as applicable)     Details if applicable:        38670 Manual Therapy (timed):  decrease pain, increase ROM, increase tissue extensibility, and decrease trigger points to improve patient's ability to progress to PLOF and address remaining functional goals. The manual therapy interventions were performed at a separate and distinct time from the therapeutic activities interventions . (see flow sheet as applicable)     Details if applicable:  Left ST joint clocks and STM to Left UT, lev scap and rhomboids. Pt right side-lying. Left GH joint inf/post mobs grade I-II. Gentle massage to Left bicep mm belly. PROM Left shoulder flex, scap and ER. Pt supine.           Details if applicable:            Details if applicable:            Details if applicable:     45 34 [facility] Totals Reminder: bill using total billable min of TIMED therapeutic procedures (example: do not include dry needle or estim unattended, both untimed codes, in totals to left)  8-22 min = 1 unit; 23-37 min = 2 units; 38-52 min = 3 units; 53-67 min = 4 units; 68-82 min = 5 units   Total Total     [x]  Patient Education billed concurrently with other procedures   [x] Review HEP    [] Progressed/Changed HEP, detail:    [] Other detail:       Objective Information/Functional Measures/Assessment  Pain at end range in all directions with pt displaying soft end feels with flex and scap. Hard end feels noted with ER. No significant changes since I.E. in AROM ER of the left shoulder noted with testing this visit, however gradual progress noted with left shoulder flexion. Continued treatment to improve left shoulder strength, stability and mobility to aid with ease of everyday activities. Patient will continue to benefit from skilled PT / OT services to modify and progress therapeutic interventions, analyze and address functional mobility deficits, analyze and address ROM deficits, analyze and address strength deficits, analyze and address soft tissue restrictions, analyze and cue for proper movement patterns, and analyze and modify for postural abnormalities to address functional deficits and attain remaining goals. Progress toward goals / Updated goals:  [x]  See Progress Note/Recertification  Short Term Goals: To be accomplished in 2 treatments:  1. Pt will report compliance and independence to HEP to help the pt manage their pain and symptoms. Eval: established   Current: Patient reports initiation 2/08/2023  Long Term Goals: To be accomplished in 10 treatments:  1. Pt will increase FOTO score to 61 points to improve ability to perform ADLs. Eval: 36 points  2. Pt will increase AROM left shoulder flex/ABD to 140 degs to improve ability to reach with less pain. Eval: flex/ degs  Current: AROM Left shoulder flexion 120 degrees, abd 115 degrees. 2/15/2023  3. Pt will increase AROM left shoulder ER to 60 degs to improve ability to tolerate daily activities. Eval: 47 degs  4.  Pt will increase functional IR left UE to L5 to improve ease of dressing. Eval: back left pant pocket  5. Pt will report having a little difficulty with reaching a shelf that is at shoulder height with the left UE to improve ability to perform household activities.    Eval: much difficulty per FOTO      PLAN  Yes  Continue plan of care  []  Upgrade activities as tolerated  []  Discharge due to :  []  Other:    Fior Awad, SANAZ    2/20/2023    1:08 PM    Future Appointments   Date Time Provider Aden Hemphill   2/22/2023  1:00 PM [facility]   2/24/2023  1:00 PM Joana Whitman, PT Danielle Maloney

## 2023-02-22 ENCOUNTER — HOSPITAL ENCOUNTER (OUTPATIENT)
Facility: HOSPITAL | Age: 63
Setting detail: RECURRING SERIES
Discharge: HOME | End: 2023-02-25
Payer: COMMERCIAL

## 2023-02-22 PROCEDURE — 97140 MANUAL THERAPY 1/> REGIONS: CPT

## 2023-02-22 PROCEDURE — 97110 THERAPEUTIC EXERCISES: CPT

## 2023-02-22 NOTE — PROGRESS NOTES
PHYSICAL / OCCUPATIONAL THERAPY - DAILY TREATMENT NOTE (updated )    Patient Name: Ani Baumann    Date: 2023    : 1960  Insurance: Payor: Torsten Linda / Plan: [employer] / Product Type: *No Product type* /      Patient  verified Yes     Visit #   Current / Total 7 10   Time   In / Out 1200 pm 12:43 pm   Pain   In / Out 4/10 6/10   Subjective Functional Status/Changes: Pt reports left shoulder mobility is getting better still painful    Changes to:  Meds, Allergies, Med Hx, Sx Hx? If yes, update Summary List no       TREATMENT AREA =  Pain in left shoulder [M25.512]    OBJECTIVE    Modalities Rationale:     decrease inflammation and decrease pain to improve patient's ability to progress to PLOF and address remaining functional goals. min [] Estim Unattended, type/location:                                      []  w/ice    []  w/heat    min [] Estim Attended, type/location:                                     []  w/US     []  w/ice    []  w/heat    []  TENS insruct      min []  Mechanical Traction: type/lbs                   []  pro   []  sup   []  int   []  cont    []  before manual    []  after manual    min []  Ultrasound, settings/location:      min []  Iontophoresis w/ dexamethasone, location:                                               []  take home patch       []  in clinic   10' + 3' set up min  unbill [x]  Ice     []  Heat    location/position: Left shoulder- seated    min []  Paraffin,  details:     min []  Vasopneumatic Device, press/temp:     min []  Judsona Saad / Natasha Reji: If using vaso (only need to measure limb vaso being performed on)      pre-treatment girth :       post-treatment girth :       measured at (landmark location) :      min []  Other:    Skin assessment post-treatment (if applicable):    []  intact    []  redness- no adverse reaction                 []redness - adverse reaction:           Therapeutic Procedures:   Tx Min Billable or 1:1 Min (if diff from Tx Min) Procedure, Rationale, Specifics   22 17 99961 Therapeutic Exercise (timed):  increase ROM, strength, coordination, balance, and proprioception to improve patient's ability to progress to PLOF and address remaining functional goals. (see flow sheet as applicable)     Details if applicable:       8 8 26792 Manual Therapy (timed):  decrease pain, increase ROM, increase tissue extensibility, and decrease trigger points to improve patient's ability to progress to PLOF and address remaining functional goals. The manual therapy interventions were performed at a separate and distinct time from the therapeutic activities interventions . (see flow sheet as applicable)     Details if applicable: In supine: Left GH joint inf/post mobs grade I-II. PROM Left shoulder flex, scap and ER. Details if applicable:            Details if applicable:            Details if applicable:     28 21 [facility] Totals Reminder: bill using total billable min of TIMED therapeutic procedures (example: do not include dry needle or estim unattended, both untimed codes, in totals to left)  8-22 min = 1 unit; 23-37 min = 2 units; 38-52 min = 3 units; 53-67 min = 4 units; 68-82 min = 5 units   Total Total     [x]  Patient Education billed concurrently with other procedures   [x] Review HEP    [] Progressed/Changed HEP, detail:    [] Other detail:       Objective Information/Functional Measures/Assessment  Pt reports pain in left shoulder with movement. Continued limitations with full range in flexion, scaption, and ER when performing manual therapy. He reports \"snapping\" when performing supine scaption with wand- per patient this has happened before and he feels like the shoulder \"releases\". Will plan to continue to progress patient per protocol for improved left shoulder mobility and strengthening for ease with ADL's and functional activities.      Patient will continue to benefit from skilled PT / OT services to modify and progress therapeutic interventions, analyze and address functional mobility deficits, analyze and address ROM deficits, analyze and address strength deficits, analyze and address soft tissue restrictions, analyze and cue for proper movement patterns, and analyze and modify for postural abnormalities to address functional deficits and attain remaining goals. Progress toward goals / Updated goals:  [x]  See Progress Note/Recertification  Short Term Goals: To be accomplished in 2 treatments:  1. Pt will report compliance and independence to [facility] to help the pt manage their pain and symptoms. Eval: established   Current: Patient reports initiation 2/08/2023  Long Term Goals: To be accomplished in 10 treatments:  1. Pt will increase FOTO score to 61 points to improve ability to perform ADLs. Eval: 36 points  Current:   2. Pt will increase AROM left shoulder flex/ABD to 140 degs to improve ability to reach with less pain. Eval: flex/ degs  Current: AROM Left shoulder flexion 120 degrees, abd 115 degrees. 2/15/2023  3. Pt will increase AROM left shoulder ER to 60 degs to improve ability to tolerate daily activities. Eval: 47 degs  Current:   4. Pt will increase functional IR left UE to L5 to improve ease of dressing. Eval: back left pant pocket  Current:   5. Pt will report having a little difficulty with reaching a shelf that is at shoulder height with the left UE to improve ability to perform household activities.    Eval: much difficulty per FOTO  Current:       PLAN  Yes  Continue plan of care  []  Upgrade activities as tolerated  []  Discharge due to :  []  Other:    Maya Trammell, PT    2/22/2023    12:56 PM      Future Appointments   Date Time Provider Aden Hemphill   2/24/2023  1:00 PM Guerita Gold, PT [place]

## 2023-02-24 ENCOUNTER — HOSPITAL ENCOUNTER (OUTPATIENT)
Facility: HOSPITAL | Age: 63
Setting detail: RECURRING SERIES
Discharge: HOME | End: 2023-02-27
Payer: COMMERCIAL

## 2023-02-24 PROCEDURE — 97140 MANUAL THERAPY 1/> REGIONS: CPT

## 2023-02-24 PROCEDURE — 97110 THERAPEUTIC EXERCISES: CPT

## 2023-02-24 NOTE — PROGRESS NOTES
[address] [place], [address] - Ph: [phone]   Fx: [phone]    PHYSICAL THERAPY PROGRESS NOTE      Patient name: Heath Valderrama Start of Care: 2023   Referral source: Lauren Lara MD : 1960               Medical Diagnosis: Pain in left shoulder [M25.512]  Payor:  / Plan: Fabiano Li 74 / Product Type: Juan José Josh /  Onset Date: DOS 2022               Treatment Diagnosis: left shoulder pain   Prior Hospitalization: see medical history Provider#: [id number]   Medications: Verified on Patient summary List    Comorbidities: DM, HTN, spinal cord stimulator may 2022   Prior Level of Function: Independent with ADLs, functional, and work activities with pain prior to the surgery. Visits from Start of Care: 8    Missed Visits: 1    Updated Goals/Measure of Progress: To be achieved in 4 weeks: The patient will demonstrate AROM to 135 degrees left shoulder to improve ease of reaching into overhead cabinets. Re-cert: 973 degrees  The patient will improve PROM of left shoulder to 145 degrees flexion to improve ease of reaching into overhead cabinets. Re-cert: 936 degrees  The patient will improve functional ER to C6 to improve ease of ADLs. Re-cert: C2  The patient will improve functional IR to sacral level to improve ease of dressing. IE: glute level    Summary of Care/ Key Functional Changes:   Short Term Goals: To be accomplished in 2 treatments:  1. Pt will report compliance and independence to [facility] to help the pt manage their pain and symptoms. Eval: established   Current: Patient reports initiation 2023  Long Term Goals: To be accomplished in 10 treatments:  1. Pt will increase FOTO score to 61 points to improve ability to perform ADLs. Eval: 36 points  Current: Progressed to 55 2023  2.  Pt will increase AROM left shoulder flex/ABD to 140 degs to improve ability to reach with less pain. Eval: flex/ degs  Current: AROM Left shoulder flexion 120 degrees, abd 115 degrees. 2/15/2023  3. Pt will increase AROM left shoulder ER to 60 degs to improve ability to tolerate daily activities. Eval: 47 degs  Current: Not met 90/90: 40 degrees, but functional ER to C2 now 2/24/2023  4. Pt will increase functional IR left UE to L5 to improve ease of dressing. Eval: back left pant pocket  Current: Remains at glute level 2/24/2023  5. Pt will report having a little difficulty with reaching a shelf that is at shoulder height with the left UE to improve ability to perform household activities. Eval: much difficulty per FOTO  Current: Progressed to \"some difficulty\" 2/24/2023         ASSESSMENT/RECOMMENDATIONS: The patient has made good progress through his first round of PT noting improvements into both PROM and AROM with flexion and ER. His FOTO has increased significant indicating improved quality of life. The patient will benefit from skilled PT in order to address the aforementioned impairments.     [x]Continue therapy per initial plan/protocol at a frequency of  2 x per week for 4 weeks  []Continue therapy with the following recommended changes:_____________________      _____________________________________________________________________  []Discontinue therapy progressing towards or have reached established goals  []Discontinue therapy due to lack of appreciable progress towards goals  []Discontinue therapy due to lack of attendance or compliance  []Await Physician's recommendations/decisions regarding therapy  []Other:________________________________________________________________    Thank you for this referral.   Dilma Robertson, PT 2/24/2023 3:19 PM

## 2023-02-24 NOTE — PROGRESS NOTES
PHYSICAL / OCCUPATIONAL THERAPY - DAILY TREATMENT NOTE (updated )    Patient Name: Aramis Rodríguez    Date: 2023    : 1960  Insurance: Payor: [employer] / Plan: [facility]N [facility] / Product Type: *No Product type* /      Patient  verified Yes     Visit #   Current / Total 8 10   Time   In / Out 1:01 1:53   Pain   In / Out 0/10 0/10   Subjective Functional Status/Changes: The patient reports he feels his shoulder is improving. He does continue to have discomfort and difficulty with reaching overhead, washing, and behind his back. He states he is due to see MD next week for a follow-up concerning his left shoulder.    Changes to:  Meds, Allergies, Med Hx, Sx Hx?  If yes, update Summary List no       TREATMENT AREA =  Pain in left shoulder [M25.512]    OBJECTIVE    Modalities Rationale:     decrease edema, decrease inflammation, and decrease pain to improve patient's ability to progress to PLOF and address remaining functional goals.    10 min [x]  Vasopneumatic Device, press/temp:  LP/LT   Skin assessment post-treatment (if applicable):    []  intact    []  redness- no adverse reaction                 []redness - adverse reaction:         Therapeutic Procedures:  Tx Min Billable or 1:1 Min (if diff from Tx Min) Procedure, Rationale, Specifics   22 10 90280 Therapeutic Exercise (timed):  increase ROM, strength, coordination, balance, and proprioception to improve patient's ability to progress to PLOF and address remaining functional goals. (see flow sheet as applicable)     Details if applicable:       12 12 57684 Neuromuscular Re-Education (timed):  improve balance, coordination, kinesthetic sense, posture, core stability and proprioception to improve patient's ability to develop conscious control of individual muscles and awareness of position of extremities in order to progress to PLOF and address remaining functional goals. (see flow sheet as applicable)     Details if applicable:     8  8 61365 Manual Therapy (timed):  decrease pain, increase ROM, and increase tissue extensibility to improve patient's ability to progress to PLOF and address remaining functional goals. The manual therapy interventions were performed at a separate and distinct time from the therapeutic activities interventions . (see flow sheet as applicable)     Details if applicable: In supine: Left GH joint inf/post mobs grade I-II. PROM Left shoulder flex, scap and ER   43 30 [facility] BC Totals Reminder: bill using total billable min of TIMED therapeutic procedures (example: do not include dry needle or estim unattended, both untimed codes, in totals to left)  8-22 min = 1 unit; 23-37 min = 2 units; 38-52 min = 3 units; 53-67 min = 4 units; 68-82 min = 5 units   Total Total     [x]  Patient Education billed concurrently with other procedures   [x] Review HEP    [] Progressed/Changed HEP, detail:    [] Other detail:       Objective Information/Functional Measures/Assessment    PROM left shoulder  Flexion: 137  ABD: 107  ER 90:90: 40    AROM left shoulder  Flexion: 125 degrees  ABD: 105 degrees  Functional ER: C2  Functional IR: Glute    FOTO: 55    The patient has made good progress through his first round of PT noting improvements into both PROM and AROM with flexion and ER. His FOTO has increased significant indicating improved quality of life. The patient will benefit from skilled PT in order to address the aforementioned impairments. Patient will continue to benefit from skilled PT / OT services to modify and progress therapeutic interventions, analyze and address functional mobility deficits, analyze and address ROM deficits, analyze and address strength deficits, analyze and address soft tissue restrictions, analyze and cue for proper movement patterns, analyze and modify for postural abnormalities, and instruct in home and community integration to address functional deficits and attain remaining goals.     Progress toward goals / Updated goals:  []  See Progress Note/Recertification    Progress Note/Recertification  Short Term Goals: To be accomplished in 2 treatments:  1. Pt will report compliance and independence to HEP to help the pt manage their pain and symptoms. Eval: established   Current: Patient reports initiation 2/08/2023  Long Term Goals: To be accomplished in 10 treatments:  1. Pt will increase FOTO score to 61 points to improve ability to perform ADLs. Eval: 36 points  Current: Progressed to 55 2/24/2023  2. Pt will increase AROM left shoulder flex/ABD to 140 degs to improve ability to reach with less pain. Eval: flex/ degs  Current: AROM Left shoulder flexion 120 degrees, abd 115 degrees. 2/15/2023  3. Pt will increase AROM left shoulder ER to 60 degs to improve ability to tolerate daily activities. Eval: 47 degs  Current: Not met 90/90: 40 degrees, but functional ER to C2 now 2/24/2023  4. Pt will increase functional IR left UE to L5 to improve ease of dressing. Eval: back left pant pocket  Current: Remains at glute level 2/24/2023  5. Pt will report having a little difficulty with reaching a shelf that is at shoulder height with the left UE to improve ability to perform household activities. Eval: much difficulty per FOTO  Current: Progressed to \"some difficulty\" 2/24/2023    PLAN  Yes  Continue plan of care  [x]  Upgrade activities as tolerated  []  Discharge due to :  []  Other:    Chloe Malhotra, PT    2/24/2023    1:20 PM    No future appointments.

## 2023-03-01 ENCOUNTER — HOSPITAL ENCOUNTER (OUTPATIENT)
Facility: HOSPITAL | Age: 63
Setting detail: RECURRING SERIES
Discharge: HOME | End: 2023-03-04
Payer: COMMERCIAL

## 2023-03-01 PROCEDURE — 97140 MANUAL THERAPY 1/> REGIONS: CPT

## 2023-03-01 PROCEDURE — 97110 THERAPEUTIC EXERCISES: CPT

## 2023-03-01 PROCEDURE — 97112 NEUROMUSCULAR REEDUCATION: CPT

## 2023-03-01 NOTE — PROGRESS NOTES
PHYSICAL / OCCUPATIONAL THERAPY - DAILY TREATMENT NOTE (updated )    Patient Name: Camelia Fleming    Date: 3/1/2023    : 1960  Insurance: Payor: Sandra Delgado / Plan: [facility] / Product Type: *No Product type* /      Patient  verified Yes     Visit #   Current / Total 9 16   Time   In / Out 12:30 1:18   Pain   In / Out 0 0   Subjective Functional Status/Changes: Pt reports no pain currently. Pt reports compliance with HEP. Changes to:  Meds, Allergies, Med Hx, Sx Hx? If yes, update Summary List no       TREATMENT AREA =  Pain in left shoulder [M25.512]    OBJECTIVE    Modalities Rationale:     decrease inflammation and decrease pain to improve patient's ability to progress to PLOF and address remaining functional goals. min [] Estim Unattended, type/location:                                      []  w/ice    []  w/heat    min [] Estim Attended, type/location:                                     []  w/US     []  w/ice    []  w/heat    []  TENS insruct      min []  Mechanical Traction: type/lbs                   []  pro   []  sup   []  int   []  cont    []  before manual    []  after manual    min []  Ultrasound, settings/location:      min []  Iontophoresis w/ dexamethasone, location:                                               []  take home patch       []  in clinic    min  unbill []  Ice     []  Heat    location/position:     min []  Paraffin,  details:     min []  Vasopneumatic Device, press/temp:     min []  Valere Fridge / Junita Books: If using vaso (only need to measure limb vaso being performed on)      pre-treatment girth :       post-treatment girth :       measured at (landmark location) :      min []  Other:    Skin assessment post-treatment (if applicable):    []  intact    []  redness- no adverse reaction                 []redness - adverse reaction:         Therapeutic Procedures:     Tx Min Billable or 1:1 Min (if diff from [place]) Procedure, Rationale, Specifics 82060 Therapeutic Exercise (timed):  increase ROM, strength, coordination, balance, and proprioception to improve patient's ability to progress to PLOF and address remaining functional goals. (see flow sheet as applicable)     Details if applicable:         68413 Neuromuscular Re-Education (timed):  improve balance, coordination, kinesthetic sense, posture, core stability and proprioception to improve patient's ability to develop conscious control of individual muscles and awareness of position of extremities in order to progress to PLOF and address remaining functional goals. (see flow sheet as applicable)     Details if applicable:       90085 Manual Therapy (timed):  decrease pain and increase ROM to improve patient's ability to progress to PLOF and address remaining functional goals. The manual therapy interventions were performed at a separate and distinct time from the therapeutic activities interventions . (see flow sheet as applicable)     Details if applicable:       [facility] Totals Reminder: bill using total billable min of TIMED therapeutic procedures (example: do not include dry needle or estim unattended, both untimed codes, in totals to left)  8-22 min = 1 unit; 23-37 min = 2 units; 38-52 min = 3 units; 53-67 min = 4 units; 68-82 min = 5 units   Total Total     [x]  Patient Education billed concurrently with other procedures   [x] Review HEP    [] Progressed/Changed HEP, detail:    [] Other detail:       Objective Information/Functional Measures/Assessment    Pt continues to have pain in anterior left shoulder with doorway stretch. Pt demonstrates improved left shoulder PROM.      Patient will continue to benefit from skilled PT / OT services to modify and progress therapeutic interventions, analyze and address functional mobility deficits, analyze and address ROM deficits, analyze and address strength deficits, and analyze and address soft tissue restrictions to address functional deficits and attain remaining goals. Progress toward goals / Updated goals:  []  See Progress Note/Recertification    The patient will demonstrate AROM to 135 degrees left shoulder to improve ease of reaching into overhead cabinets. Re-cert: 968 degrees  The patient will improve PROM of left shoulder to 145 degrees flexion to improve ease of reaching into overhead cabinets. Re-cert: 312 degrees  The patient will improve functional ER to C6 to improve ease of ADLs. Re-cert: C2  The patient will improve functional IR to sacral level to improve ease of dressing.   IE: glute level       PLAN  Yes  Continue plan of care  []  Upgrade activities as tolerated  []  Discharge due to :  []  Other:    Samara Schilder, PTA    3/1/2023    1:10 PM    Future Appointments   Date Time Provider Aden Hemphill   3/3/2023  2:30 PM Samara Schilder, PTA [facility]   3/7/2023  1:30 PM Samara Schilder, PTA [facility]   3/9/2023  1:30 PM Aris Rome, PTA [facility] [place]   3/13/2023  1:30 PM Samara Schilder, PTA [facility] [place]   3/15/2023  2:30 PM Aris Rome, PTA [facility] [place]   3/20/2023  3:00 PM Bobetta Rinne, PT [facility] [place]   3/22/2023  2:00 PM Bobetta Rinne, PT [facility]   3/27/2023  2:30 PM Bobetta Rinne, PT [facility]   3/29/2023  2:30 PM Aris Rome, SANAZ Kirby

## 2023-03-03 ENCOUNTER — HOSPITAL ENCOUNTER (OUTPATIENT)
Facility: HOSPITAL | Age: 63
Setting detail: RECURRING SERIES
Discharge: HOME | End: 2023-03-06
Payer: OTHER GOVERNMENT

## 2023-03-03 PROCEDURE — 97110 THERAPEUTIC EXERCISES: CPT

## 2023-03-03 PROCEDURE — 97140 MANUAL THERAPY 1/> REGIONS: CPT

## 2023-03-03 NOTE — PROGRESS NOTES
PHYSICAL / OCCUPATIONAL THERAPY - DAILY TREATMENT NOTE (updated )    Patient Name: Stephy Carvalho    Date: 3/3/2023    : 1960  Insurance: Payor: Crystal Buggy / Plan: [facility] / Product Type: *No Product type* /      Patient  verified Yes     Visit #   Current / Total 10 16   Time   In / Out 12:30 3:08   Pain   In / Out 3-4 0   Subjective Functional Status/Changes: Pt reports no new complaints of pain. Changes to:  Meds, Allergies, Med Hx, Sx Hx? If yes, update Summary List no       TREATMENT AREA =  Pain in left shoulder [M25.512]    OBJECTIVE         Therapeutic Procedures: Tx Min Billable or 1:1 Min (if diff from Tx Min) Procedure, Rationale, Specifics   30  17225 Therapeutic Exercise (timed):  increase ROM, strength, coordination, balance, and proprioception to improve patient's ability to progress to PLOF and address remaining functional goals. (see flow sheet as applicable)     Details if applicable:       8  46258 Manual Therapy (timed):  decrease pain, increase ROM, and increase tissue extensibility to improve patient's ability to progress to PLOF and address remaining functional goals. The manual therapy interventions were performed at a separate and distinct time from the therapeutic activities interventions . (see flow sheet as applicable)     Details if applicable:  PROM to left shoulder with patient in supine. 45  [address] Totals Reminder: bill using total billable min of TIMED therapeutic procedures (example: do not include dry needle or estim unattended, both untimed codes, in totals to left)  8-22 min = 1 unit; 23-37 min = 2 units; 38-52 min = 3 units; 53-67 min = 4 units; 68-82 min = 5 units   Total Total     [x]  Patient Education billed concurrently with other procedures   [x] Review HEP    [] Progressed/Changed HEP, detail:    [] Other detail:       Objective Information/Functional Measures/Assessment    Progressed exercises as per flow sheet.      Patient will continue to benefit from skilled PT / OT services to modify and progress therapeutic interventions, analyze and address functional mobility deficits, analyze and address ROM deficits, analyze and address strength deficits, analyze and address soft tissue restrictions, analyze and cue for proper movement patterns, and analyze and modify for postural abnormalities to address functional deficits and attain remaining goals. Progress toward goals / Updated goals:  []  See Progress Note/Recertification    The patient will demonstrate AROM to 135 degrees left shoulder to improve ease of reaching into overhead cabinets. Re-cert: 253 degrees  The patient will improve PROM of left shoulder to 145 degrees flexion to improve ease of reaching into overhead cabinets. Re-cert: 155 degrees  The patient will improve functional ER to C6 to improve ease of ADLs. Re-cert: C2  The patient will improve functional IR to sacral level to improve ease of dressing.   IE: glute level    PLAN  Yes  Continue plan of care  []  Upgrade activities as tolerated  []  Discharge due to :  []  Other:    Miri Walden PTA    3/3/2023    2:40 PM    Future Appointments   Date Time Provider Aden Hemphill   3/7/2023  1:30 PM [facility]   3/9/2023  1:30 PM Xin [facility]   3/13/2023  1:30 PM [facility] Walden[facility]   3/15/2023  2:30 PM Xiniliana Jorge, [facility]   3/20/2023  3:00 PM Tahira Teresa, [facility]   3/22/2023  2:00 PM [facility] Teresa[facility]   3/27/2023  2:30 PM [facility] Teresa[facility]   3/29/2023  2:30 PM Xin Jorge, SANAZ Nettles

## 2023-03-07 ENCOUNTER — HOSPITAL ENCOUNTER (OUTPATIENT)
Facility: HOSPITAL | Age: 63
Setting detail: RECURRING SERIES
Discharge: HOME | End: 2023-03-10
Payer: OTHER GOVERNMENT

## 2023-03-07 PROCEDURE — 97110 THERAPEUTIC EXERCISES: CPT

## 2023-03-07 PROCEDURE — 97140 MANUAL THERAPY 1/> REGIONS: CPT

## 2023-03-07 NOTE — PROGRESS NOTES
PHYSICAL / OCCUPATIONAL THERAPY - DAILY TREATMENT NOTE (updated )    Patient Name: Sammy Smith    Date: 3/7/2023    : 1960  Insurance: Payor: Swetha aCsey / Plan: [facility] / Product Type: *No Product type* /      Patient  verified Yes     Visit #   Current / Total 11 16   Time   In / Out 1:30 2:04   Pain   In / Out 2 4-5   Subjective Functional Status/Changes: Pt reports no new complaints of pain. Changes to:  Meds, Allergies, Med Hx, Sx Hx? If yes, update Summary List no       TREATMENT AREA =  Pain in left shoulder [M25.512]    OBJECTIVE    Therapeutic Procedures: Tx Min Billable or 1:1 Min (if diff from Tx Min) Procedure, Rationale, Specifics   16 16 65381 Therapeutic Exercise (timed):  increase ROM, strength, coordination, balance, and proprioception to improve patient's ability to progress to PLOF and address remaining functional goals. (see flow sheet as applicable)     Details if applicable:       10  32669 Neuromuscular Re-Education (timed):  improve balance, coordination, kinesthetic sense, posture, core stability and proprioception to improve patient's ability to develop conscious control of individual muscles and awareness of position of extremities in order to progress to PLOF and address remaining functional goals. (see flow sheet as applicable)     Details if applicable:     8 8 50755 Manual Therapy (timed):  increase ROM and increase tissue extensibility to improve patient's ability to progress to PLOF and address remaining functional goals. The manual therapy interventions were performed at a separate and distinct time from the therapeutic activities interventions . (see flow sheet as applicable)     Details if applicable:  Pt in supine: PROM to left shoulder as per flow sheet.     29 83 [phone] [place] Totals Reminder: bill using total billable min of TIMED therapeutic procedures (example: do not include dry needle or estim unattended, both untimed codes, in totals to left)  8-22 min = 1 unit; 23-37 min = 2 units; 38-52 min = 3 units; 53-67 min = 4 units; 68-82 min = 5 units   Total Total     [x]  Patient Education billed concurrently with other procedures   [x] Review HEP    [] Progressed/Changed HEP, detail:    [] Other detail:       Objective Information/Functional Measures/Assessment    Added UBE; instructed patient to slow down with pulleys to improve stretch. Patient will continue to benefit from skilled PT / OT services to modify and progress therapeutic interventions, analyze and address functional mobility deficits, analyze and address ROM deficits, analyze and address strength deficits, analyze and address soft tissue restrictions, analyze and cue for proper movement patterns, and analyze and modify for postural abnormalities to address functional deficits and attain remaining goals. Progress toward goals / Updated goals:  []  See Progress Note/Recertification    The patient will demonstrate AROM to 135 degrees left shoulder to improve ease of reaching into overhead cabinets. Re-cert: 037 degrees  The patient will improve PROM of left shoulder to 145 degrees flexion to improve ease of reaching into overhead cabinets. Re-cert: 884 degrees  The patient will improve functional ER to C6 to improve ease of ADLs. Re-cert: C2  The patient will improve functional IR to sacral level to improve ease of dressing.   IE: glute level    PLAN  No  Continue plan of care  []  Upgrade activities as tolerated  []  Discharge due to :  []  Other:    Kain Buckley PTA    3/7/2023    1:41 PM    Future Appointments   Date Time Provider Aden Hemphlil   3/9/2023  1:30 PM Cassius Cano PTA [facility]   3/13/2023  1:30 PM Kain Buckley PTA [facility]   3/15/2023  2:30 PM Cassius Cano PTA [facility]   3/20/2023  3:00 PM Stephany Lock, PT [facility]   3/22/2023  2:00 PM Stephany Lock, PT [facility]   3/27/2023  2:30 PM [facility]   3/29/2023  2:30 PM SANAZ Burris

## 2023-03-09 ENCOUNTER — HOSPITAL ENCOUNTER (OUTPATIENT)
Facility: HOSPITAL | Age: 63
Setting detail: RECURRING SERIES
Discharge: HOME | End: 2023-03-12
Payer: OTHER GOVERNMENT

## 2023-03-09 PROCEDURE — 97140 MANUAL THERAPY 1/> REGIONS: CPT

## 2023-03-09 PROCEDURE — 97110 THERAPEUTIC EXERCISES: CPT

## 2023-03-09 NOTE — PROGRESS NOTES
PHYSICAL / OCCUPATIONAL THERAPY - DAILY TREATMENT NOTE (updated )    Patient Name: Jacky Cruz    Date: 3/9/2023    : 1960  Insurance: Payor: Geovani Arndt / Plan: [facility] / Product Type: *No Product type* /      Patient  verified Yes     Visit #   Current / Total 12 16   Time   In / Out 1:30 2:20   Pain   In / Out 3 4   Subjective Functional Status/Changes: Pt reports he doing ok but he is feeling tired. Changes to:  Meds, Allergies, Med Hx, Sx Hx? If yes, update Summary List no       TREATMENT AREA =  Pain in left shoulder [M25.512]    OBJECTIVE    Modality rationale: decrease inflammation and decrease pain to improve the patients ability to    Min Type Additional Details    [] Estim:  []Unatt       []IFC  []Premod                        []Other:  []w/ice   []w/heat  Position:  Location:    [] Estim: []Att    []TENS instruct  []NMES                    []Other:  []w/US   []w/ice   []w/heat  Position:  Location:    []  Traction: [] Cervical       []Lumbar                       [] Prone          []Supine                       []Intermittent   []Continuous Lbs:  [] before manual  [] after manual    []  Ultrasound: []Continuous   [] Pulsed                           []1MHz   []3MHz Location:  W/cm2:    []  Iontophoresis with dexamethasone         Location: [] Take home patch   [] In clinic    []  Ice     []  heat  []  Ice massage  []  Laser   []  Anodyne Position:  Location:    []  Laser with stim  []  Other: Position:  Location:   10 [x]  Vasopneumatic Device  Pre-treatment girth:  Post-treatment girth:  Measured at (location):  Pressure:       [x] lo [] med [] hi   Temperature: [] lo [] med [] hi   [x] Skin assessment post-treatment:  [x]intact []redness- no adverse reaction    []redness - adverse reaction:       Therapeutic Procedures:   Tx Min Billable or 1:1 Min (if diff from Tx Min) Procedure, Rationale, Specifics   22 12 66283 Therapeutic Exercise (timed):  increase ROM, strength, coordination, balance, and proprioception to improve patient's ability to progress to PLOF and address remaining functional goals. (see flow sheet as applicable)     Details if applicable:       10 10    41697 Neuromuscular Re-Education (timed):  improve balance, coordination, kinesthetic sense, posture, core stability and proprioception to improve patient's ability to develop conscious control of individual muscles and awareness of position of extremities in order to progress to PLOF and address remaining functional goals. (see flow sheet as applicable)  Details if applicable:     8 8 58010 Manual Therapy (timed):  decrease pain, increase ROM, and increase tissue extensibility to improve patient's ability to progress to PLOF and address remaining functional goals. The manual therapy interventions were performed at a separate and distinct time from the therapeutic activities interventions . (see flow sheet as applicable)     Details if applicable:   Pt in supine: PROM to left shoulder as per flow sheet.            Details if applicable:            Details if applicable:     37 35 [address] [facility] [address] Reminder: bill using total billable min of TIMED therapeutic procedures (example: do not include dry needle or estim unattended, both untimed codes, in totals to left)  8-22 min = 1 unit; 23-37 min = 2 units; 38-52 min = 3 units; 53-67 min = 4 units; 68-82 min = 5 units   Total Total     TOTAL TREATMENT TIME:        50     [x]  Patient Education billed concurrently with other procedures   [x] Review HEP    [] Progressed/Changed HEP, detail:    [] Other detail:       Objective Information/Functional Measures/Assessment  Pt reports and displays a continued improvement in left shoulder PROM and functional mobility, however notes end range pain in the left shoulder in all directions noted during manual.      Patient will continue to benefit from skilled PT / OT services to modify and progress therapeutic interventions, analyze and address functional mobility deficits, analyze and address ROM deficits, analyze and address strength deficits, analyze and address soft tissue restrictions, analyze and cue for proper movement patterns, and analyze and modify for postural abnormalities to address functional deficits and attain remaining goals. Progr   The patient will demonstrate AROM to 135 degrees left shoulder to improve ease of reaching into overhead cabinets. Re-cert: 807 degrees  The patient will improve PROM of left shoulder to 145 degrees flexion to improve ease of reaching into overhead cabinets. Re-cert: 781 degrees  The patient will improve functional ER to C6 to improve ease of ADLs. Re-cert: C2  The patient will improve functional IR to sacral level to improve ease of dressing.   IE: glute level      PLAN  Yes  Continue plan of care  []  Upgrade activities as tolerated  []  Discharge due to :  []  Other:    Orville Ewing PTA    3/9/2023    1:51 PM    Future Appointments   Date Time Provider Aden Hemphill   3/13/2023  1:30 PM Scarlett Zheng, SANAZ [facility]   3/15/2023  2:30 PM [facility]   3/20/2023  3:00 PM [facility]   3/22/2023  2:00 PM [facility]   3/27/2023  2:30 PM [facility]   3/29/2023  2:30 PM SANAZ Cesar

## 2023-03-13 ENCOUNTER — HOSPITAL ENCOUNTER (OUTPATIENT)
Facility: HOSPITAL | Age: 63
Setting detail: RECURRING SERIES
Discharge: HOME | End: 2023-03-16
Payer: OTHER GOVERNMENT

## 2023-03-13 PROCEDURE — 97110 THERAPEUTIC EXERCISES: CPT

## 2023-03-13 PROCEDURE — 97140 MANUAL THERAPY 1/> REGIONS: CPT

## 2023-03-13 PROCEDURE — 97112 NEUROMUSCULAR REEDUCATION: CPT

## 2023-03-13 NOTE — PROGRESS NOTES
PHYSICAL / OCCUPATIONAL THERAPY - DAILY TREATMENT NOTE (updated )    Patient Name: Wale Marking    Date: 3/13/2023    : 1960  Insurance: Payor: [employer] / Plan: [employer] / Product Type: *No Product type* /      Patient  verified Yes     Visit #   Current / Total 13 16   Time   In / Out 1:30 2:09   Pain   In / Out 0 0   Subjective Functional Status/Changes: Pt reports his shoulder is doing better and it did not hurt all weekend. Changes to:  Meds, Allergies, Med Hx, Sx Hx? If yes, update Summary List no       TREATMENT AREA =  Pain in left shoulder [M25.512]    OBJECTIVE      Therapeutic Procedures: Tx Min Billable or 1:1 Min (if diff from Tx Min) Procedure, Rationale, Specifics   21  95362 Therapeutic Exercise (timed):  increase ROM, strength, coordination, balance, and proprioception to improve patient's ability to progress to PLOF and address remaining functional goals. (see flow sheet as applicable)     Details if applicable:       10  06132 Neuromuscular Re-Education (timed):  improve balance, coordination, kinesthetic sense, posture, core stability and proprioception to improve patient's ability to develop conscious control of individual muscles and awareness of position of extremities in order to progress to PLOF and address remaining functional goals. (see flow sheet as applicable)     Details if applicable:     8  74251 Manual Therapy (timed):  decrease pain, increase ROM, and increase tissue extensibility to improve patient's ability to progress to PLOF and address remaining functional goals. The manual therapy interventions were performed at a separate and distinct time from the therapeutic activities interventions .  (see flow sheet as applicable)     Details if applicable:     44  [facility] Totals Reminder: bill using total billable min of TIMED therapeutic procedures (example: do not include dry needle or estim unattended, both untimed codes, in totals to left)  8-22 min = 1 unit; 23-37 min = 2 units; 38-52 min = 3 units; 53-67 min = 4 units; 68-82 min = 5 units   Total Total     TOTAL TREATMENT TIME:        39     [x]  Patient Education billed concurrently with other procedures   [x] Review HEP    [] Progressed/Changed HEP, detail:    [] Other detail:       Objective Information/Functional Measures/Assessment    Mod vc's for form with therapeutic exercises. Pt continues to demonstrates  limited left shoulder mobility but continues to make slow progress toward goals. Patient will continue to benefit from skilled PT / OT services to modify and progress therapeutic interventions, analyze and address functional mobility deficits, analyze and address ROM deficits, analyze and address strength deficits, analyze and address soft tissue restrictions, analyze and cue for proper movement patterns, and analyze and modify for postural abnormalities to address functional deficits and attain remaining goals. Progress toward goals / Updated goals:  []  See Progress Note/Recertification    The patient will demonstrate AROM to 135 degrees left shoulder to improve ease of reaching into overhead cabinets. Re-cert: 207 degrees  The patient will improve PROM of left shoulder to 145 degrees flexion to improve ease of reaching into overhead cabinets. Re-cert: 129 degrees  The patient will improve functional ER to C6 to improve ease of ADLs. Re-cert: C2  The patient will improve functional IR to sacral level to improve ease of dressing.   IE: glute level    PLAN  Yes  Continue plan of care  []  Upgrade activities as tolerated  []  Discharge due to :  []  Other:    Susannah Garcia PTA    3/13/2023    1:49 PM    Future Appointments   Date Time Provider Aden Hemphill   3/15/2023  2:30 PM [facility]   3/20/2023  3:00 PM XOCHILT Zaragoza   3/22/2023  2:00 PM XOCHILT Zaragoza 3/27/2023  2:30 PM Guerita Gold, PT [facility] [place]   3/29/2023  2:30 PM SANAZ Abdalla

## 2023-03-15 ENCOUNTER — HOSPITAL ENCOUNTER (OUTPATIENT)
Facility: HOSPITAL | Age: 63
Setting detail: RECURRING SERIES
Discharge: HOME | End: 2023-03-18
Payer: OTHER GOVERNMENT

## 2023-03-15 PROCEDURE — 97110 THERAPEUTIC EXERCISES: CPT

## 2023-03-15 PROCEDURE — 97112 NEUROMUSCULAR REEDUCATION: CPT

## 2023-03-15 PROCEDURE — 97140 MANUAL THERAPY 1/> REGIONS: CPT

## 2023-03-15 NOTE — PROGRESS NOTES
PHYSICAL / OCCUPATIONAL THERAPY - DAILY TREATMENT NOTE (updated )    Patient Name: Jaylene Records    Date: 3/15/2023    : 1960  Insurance: Payor: [employer] / Plan: [employer] / Product Type: *No Product type* /      Patient  verified Yes     Visit #   Current / Total 14 16   Time   In / Out 2:32 3:21   Pain   In / Out 3 4   Subjective Functional Status/Changes: Pt states he slept on his shoulder last night and its sore today. Changes to:  Meds, Allergies, Med Hx, Sx Hx? If yes, update Summary List yes       TREATMENT AREA =  Pain in left shoulder [M25.512]    OBJECTIVE    Modalities Rationale:     decrease inflammation and decrease pain to improve patient's ability to progress to PLOF and address remaining functional goals. min [] Estim Unattended, type/location:                                      []  w/ice    []  w/heat    min [] Estim Attended, type/location:                                     []  w/US     []  w/ice    []  w/heat    []  TENS insruct      min []  Mechanical Traction: type/lbs                   []  pro   []  sup   []  int   []  cont    []  before manual    []  after manual    min []  Ultrasound, settings/location:      min []  Iontophoresis w/ dexamethasone, location:                                               []  take home patch       []  in clinic        min  unbilled []  Ice     []  Heat    location/position:     min []  Paraffin,  details:    10 min []  Vasopneumatic Device, press/temp: LP/LT    min []  Brooks Idler / Wadell Greenfield: If using vaso (only need to measure limb vaso being performed on)      pre-treatment girth :       post-treatment girth :       measured at (landmark location) :      min []  Other:    Skin assessment post-treatment (if applicable):    [x]  intact    [x]  redness- no adverse reaction                 []redness - adverse reaction:         Therapeutic Procedures:   Tx Min Billable or 1:1 Min (if diff from [employer]) Procedure, Rationale, Specifics   21  29427 Therapeutic Exercise (timed):  increase ROM, strength, coordination, balance, and proprioception to improve patient's ability to progress to PLOF and address remaining functional goals. (see flow sheet as applicable)     Details if applicable:       10  65950 Neuromuscular Re-Education (timed):  improve balance, coordination, kinesthetic sense, posture, core stability and proprioception to improve patient's ability to develop conscious control of individual muscles and awareness of position of extremities in order to progress to PLOF and address remaining functional goals. (see flow sheet as applicable)     Details if applicable:     8 19798 Manual Therapy (timed):  decrease pain, increase ROM, and increase tissue extensibility to improve patient's ability to progress to PLOF and address remaining functional goals. The manual therapy interventions were performed at a separate and distinct time from the therapeutic activities interventions . (see flow sheet as applicable)     Details if applicable:            Details if applicable:            Details if applicable:     44  [facility] Totals Reminder: bill using total billable min of TIMED therapeutic procedures (example: do not include dry needle or estim unattended, both untimed codes, in totals to left)  8-22 min = 1 unit; 23-37 min = 2 units; 38-52 min = 3 units; 53-67 min = 4 units; 68-82 min = 5 units   Total Total     TOTAL TREATMENT TIME:        49     [x]  Patient Education billed concurrently with other procedures   [x] Review HEP    [] Progressed/Changed HEP, detail:    [] Other detail:       Objective Information/Functional Measures/Assessment  Scaption pulley's initiated with pt noting increased tightness that improved as he worked through the exercise. Improved passive and active ROM of the left shoulder noted with testing iwht pt reaching goal #1 and almost reaching ROM goal #2.  Minor increase in pain post treatment but pt left in no apparent distress. Patient will continue to benefit from skilled PT / OT services to modify and progress therapeutic interventions, analyze and address functional mobility deficits, analyze and address ROM deficits, analyze and address strength deficits, analyze and address soft tissue restrictions, analyze and cue for proper movement patterns, and analyze and modify for postural abnormalities to address functional deficits and attain remaining goals. Progress toward goals / Updated goals:  [x]  See Progress Note/Recertification  The patient will demonstrate AROM to 135 degrees left shoulder to improve ease of reaching into overhead cabinets. Re-cert: 155 degrees   Current: Met 3/15/23 AROM left shoulder flexion 140 deg  The patient will improve PROM of left shoulder to 145 degrees flexion to improve ease of reaching into overhead cabinets. Re-cert: 881 degrees   Current: progressing 3/15/23 PROM left shoulder flex 142 deg  The patient will improve functional ER to C6 to improve ease of ADLs. Re-cert: C2  The patient will improve functional IR to sacral level to improve ease of dressing.   IE: glute level      PLAN  Yes  Continue plan of care  []  Upgrade activities as tolerated  []  Discharge due to :  []  Other:    Asim Severance, PTA    3/15/2023    2:28 PM    Future Appointments   Date Time Provider Aden Hemphill   3/15/2023  2:30 PM Asim Severance, PTA [facility]   3/20/2023  3:00 PM Lady Lighter, PT [facility]   3/22/2023  2:00 PM [facility]   3/27/2023  2:30 PM Lady Lighter, PT [facility]   3/29/2023  2:30 PM Asim Severance, PTA [facility] Pham

## 2023-03-20 ENCOUNTER — HOSPITAL ENCOUNTER (OUTPATIENT)
Facility: HOSPITAL | Age: 63
Setting detail: RECURRING SERIES
Discharge: HOME | End: 2023-03-23
Payer: OTHER GOVERNMENT

## 2023-03-20 PROCEDURE — 97112 NEUROMUSCULAR REEDUCATION: CPT

## 2023-03-20 PROCEDURE — 97110 THERAPEUTIC EXERCISES: CPT

## 2023-03-20 PROCEDURE — 97140 MANUAL THERAPY 1/> REGIONS: CPT

## 2023-03-20 NOTE — PROGRESS NOTES
functional goals. (see flow sheet as applicable)     Details if applicable:  full cans, scap cues, sidelying with scap cues. 8 9 10793 Manual Therapy (timed):  decrease pain, increase ROM, and increase tissue extensibility to improve patient's ability to progress to PLOF and address remaining functional goals. The manual therapy interventions were performed at a separate and distinct time from the therapeutic activities interventions . (see flow sheet as applicable)     Details if applicable:  left GHJ inferior/posterior capsular mobs into flexion/ABD/ER and gently IR. Performed with the patient in supine. 41 45 [facility] Totals Reminder: bill using total billable min of TIMED therapeutic procedures (example: do not include dry needle or estim unattended, both untimed codes, in totals to left)  8-22 min = 1 unit; 23-37 min = 2 units; 38-52 min = 3 units; 53-67 min = 4 units; 68-82 min = 5 units   Total Total     TOTAL TREATMENT TIME:        55     [x]  Patient Education billed concurrently with other procedures   [x] Review HEP    [] Progressed/Changed HEP, detail:    [] Other detail:       Objective Information/Functional Measures/Assessment  Left shoulder AROM:  KEI: C6  FIR: Glute    The patient has progressed significantly well concerning KEI and overall AROM to date. His FIR is progressing slowly. Added cross body stretch gently to promote improved posterior capsule extensibility. The patient completed session to good effect and left in no apparent distress.      Patient will continue to benefit from skilled PT / OT services to modify and progress therapeutic interventions, analyze and address functional mobility deficits, analyze and address ROM deficits, analyze and address strength deficits, analyze and address soft tissue restrictions, analyze and cue for proper movement patterns, analyze and modify for postural abnormalities, and instruct in home and community integration to address functional deficits

## 2023-03-22 ENCOUNTER — HOSPITAL ENCOUNTER (OUTPATIENT)
Facility: HOSPITAL | Age: 63
Setting detail: RECURRING SERIES
Discharge: HOME | End: 2023-03-25
Payer: OTHER GOVERNMENT

## 2023-03-22 PROCEDURE — 97110 THERAPEUTIC EXERCISES: CPT

## 2023-03-22 PROCEDURE — 97112 NEUROMUSCULAR REEDUCATION: CPT

## 2023-03-22 PROCEDURE — 97140 MANUAL THERAPY 1/> REGIONS: CPT

## 2023-03-22 NOTE — PROGRESS NOTES
[address] [place], [address] - Ph: [phone]   Fx: [phone]    PHYSICAL THERAPY PROGRESS NOTE      Patient name: Gerhardt Sessions Start of Care: 2023   Referral source: Paresh Fitzgerald MD : 1960               Medical Diagnosis: Pain in left shoulder [M25.512]  Payor:  / Plan: Fabiano Li 74 / Product Type: [place] /  Onset Date: DOS 2022               Treatment Diagnosis: left shoulder pain   Prior Hospitalization: see medical history Provider#: [id number]   Medications: Verified on Patient summary List    Comorbidities: DM, HTN, spinal cord stimulator may 2022   Prior Level of Function: Independent with ADLs, functional, and work activities with pain prior to the surgery. Visits from Start of Care: 16    Missed Visits: 1    Goals/Measure of Progress: To be achieved in 4 weeks: The patient will improve AROM flexion to 150 degrees to improve ease of reaching into overhead cabinets. The patient will improve functional IR to L5 to improve ease of ADLs. 3.   The patient will improve shoulder flexion strength to 4/5 MMT to improve ease of ADLs. 4.   The patient will improve FOTO score to predicted to 61 to improved quality of life. Summary of Care/ Key Functional Changes:     Progress toward goals / Updated goals:  [x]  See Progress Note/Recertification  The patient will demonstrate AROM to 135 degrees left shoulder to improve ease of reaching into overhead cabinets. Re-cert: 303 degrees              Current: Met 3/15/23 AROM left shoulder flexion 140 deg  The patient will improve PROM of left shoulder to 145 degrees flexion to improve ease of reaching into overhead cabinets. Re-cert: 638 degrees              Current: progressing 3/15/23 PROM left shoulder flex 142 deg  The patient will improve functional ER to C6 to improve ease of ADLs.

## 2023-03-22 NOTE — PROGRESS NOTES
PHYSICAL / OCCUPATIONAL THERAPY - DAILY TREATMENT NOTE (updated )    Patient Name: Wang Camacho    Date: 3/22/2023    : 1960  Insurance: Payor:  EAST / Plan: [employer]AST / Product Type: *No Product type* /      Patient  verified Yes     Visit #   Current / Total 16 16   Time   In / Out 2:00 2:56   Pain   In / Out 0/10 0/10   Subjective Functional Status/Changes: The patient states he was able to put his belt on for the first time, but he felt it stretch. Changes to:  Meds, Allergies, Med Hx, Sx Hx? If yes, update Summary List no      TREATMENT AREA =  Pain in left shoulder [M25.512]    OBJECTIVE    Modalities Rationale:     decrease edema, decrease inflammation, and decrease pain to improve patient's ability to progress to PLOF and address remaining functional goals. 10 min [x]  Vasopneumatic Device, press/temp:  Left shoulder LP/LT    min []  Franklin Sniff / Serina Donald: If using vaso (only need to measure limb vaso being performed on)      pre-treatment girth :       post-treatment girth :       measured at (landmark location) :      min []  Other:    Skin assessment post-treatment (if applicable):    []  intact    []  redness- no adverse reaction                 []redness - adverse reaction:         Therapeutic Procedures: Tx Min Billable or 1:1 Min (if diff from Tx Min) Procedure, Rationale, Specifics   26 94 09698 Therapeutic Exercise (timed):  increase ROM, strength, coordination, balance, and proprioception to improve patient's ability to progress to PLOF and address remaining functional goals. (see flow sheet as applicable)     Details if applicable:       93653 Neuromuscular Re-Education (timed):  improve balance, coordination, kinesthetic sense, posture, core stability and proprioception to improve patient's ability to develop conscious control of individual muscles and awareness of position of extremities in order to progress to PLOF and address remaining functional goals.

## 2023-03-27 ENCOUNTER — HOSPITAL ENCOUNTER (OUTPATIENT)
Facility: HOSPITAL | Age: 63
Setting detail: RECURRING SERIES
Discharge: HOME | End: 2023-03-30
Payer: COMMERCIAL

## 2023-03-27 PROCEDURE — 97110 THERAPEUTIC EXERCISES: CPT

## 2023-03-27 PROCEDURE — 97112 NEUROMUSCULAR REEDUCATION: CPT

## 2023-03-27 PROCEDURE — 97140 MANUAL THERAPY 1/> REGIONS: CPT

## 2023-03-27 NOTE — PROGRESS NOTES
8 [id number] Manual Therapy (timed):  decrease pain, increase ROM, and increase tissue extensibility to improve patient's ability to progress to PLOF and address remaining functional goals. The manual therapy interventions were performed at a separate and distinct time from the therapeutic activities interventions . (see flow sheet as applicable)     Details if applicable:   left GHJ inferior/posterior capsular mobs grade III with the patient in supine into flexion/ABD/cross body. 41 45 [facility] Totals Reminder: bill using total billable min of TIMED therapeutic procedures (example: do not include dry needle or estim unattended, both untimed codes, in totals to left)  8-22 min = 1 unit; 23-37 min = 2 units; 38-52 min = 3 units; 53-67 min = 4 units; 68-82 min = 5 units   Total Total     [x]  Patient Education billed concurrently with other procedures   [x] Review HEP    [] Progressed/Changed HEP, detail:    [] Other detail:       Objective Information/Functional Measures/Assessment  The patient continues to make gradual progress with PT. He demonstrates compensations in a capsular pattern of his left shoulder, though it is improving gradually with PT. He continues to report compliance with HEP. Patient will continue to benefit from skilled PT / OT services to modify and progress therapeutic interventions, analyze and address functional mobility deficits, analyze and address ROM deficits, analyze and address strength deficits, analyze and address soft tissue restrictions, analyze and cue for proper movement patterns, analyze and modify for postural abnormalities, and instruct in home and community integration to address functional deficits and attain remaining goals. Goals/Measure of Progress: To be achieved in 4 weeks: The patient will improve AROM flexion to 150 degrees to improve ease of reaching into overhead cabinets. PN: 140 degrees  The patient will improve functional IR to L5 to improve ease of ADLs.

## 2023-03-29 ENCOUNTER — HOSPITAL ENCOUNTER (OUTPATIENT)
Facility: HOSPITAL | Age: 63
Setting detail: RECURRING SERIES
Discharge: HOME | End: 2023-04-01
Payer: COMMERCIAL

## 2023-03-29 PROCEDURE — 97110 THERAPEUTIC EXERCISES: CPT

## 2023-03-29 PROCEDURE — 97112 NEUROMUSCULAR REEDUCATION: CPT

## 2023-03-29 PROCEDURE — 97140 MANUAL THERAPY 1/> REGIONS: CPT

## 2023-03-29 NOTE — PROGRESS NOTES
PHYSICAL / OCCUPATIONAL THERAPY - DAILY TREATMENT NOTE (updated )    Patient Name: Norah Dyer    Date: 3/29/2023    : 1960  Insurance: Payor: Winifred Solis / Plan: [address] [place] [address] / Product Type: *No Product type* /      Patient  verified Yes     Visit #   Current / Total 18 24   Time   In / Out 230 313   Pain   In / Out 0 1   Subjective Functional Status/Changes: Pt reports his shoulder continues to get better every day. Changes to:  Meds, Allergies, Med Hx, Sx Hx? If yes, update Summary List no       TREATMENT AREA =  Pain in left shoulder [M25.512]    OBJECTIVE      Therapeutic Procedures: Tx Min Billable or 1:1 Min (if diff from Tx Min) Procedure, Rationale, Specifics   23 20 14634 Therapeutic Exercise (timed):  increase ROM, strength, coordination, balance, and proprioception to improve patient's ability to progress to PLOF and address remaining functional goals. (see flow sheet as applicable)     Details if applicable:       12 12 23498 Neuromuscular Re-Education (timed):  improve balance, coordination, kinesthetic sense, posture, core stability and proprioception to improve patient's ability to develop conscious control of individual muscles and awareness of position of extremities in order to progress to PLOF and address remaining functional goals. (see flow sheet as applicable)     Details if applicable:     8  35988 Manual Therapy (timed):  decrease pain, increase ROM, and increase tissue extensibility to improve patient's ability to progress to PLOF and address remaining functional goals. The manual therapy interventions were performed at a separate and distinct time from the therapeutic activities interventions . (see flow sheet as applicable)     Details if applicable:   left GHJ inferior/posterior capsular mobs grade III with the patient in supine into flexion/ABD/cross body.            Details if applicable:            Details if applicable:     40 38 [address] [place] [address] Way Reminder: bill

## 2023-04-05 ENCOUNTER — HOSPITAL ENCOUNTER (OUTPATIENT)
Facility: HOSPITAL | Age: 63
Setting detail: RECURRING SERIES
Discharge: HOME | End: 2023-04-08
Payer: COMMERCIAL

## 2023-04-05 PROCEDURE — 97112 NEUROMUSCULAR REEDUCATION: CPT

## 2023-04-05 PROCEDURE — 97140 MANUAL THERAPY 1/> REGIONS: CPT

## 2023-04-05 PROCEDURE — 97110 THERAPEUTIC EXERCISES: CPT

## 2023-04-05 NOTE — PROGRESS NOTES
53-67 min = 4 units; 68-82 min = 5 units   Total Total     TOTAL TREATMENT TIME:        38     [x]  Patient Education billed concurrently with other procedures   [x] Review HEP    [] Progressed/Changed HEP, detail:    [] Other detail:       Objective Information/Functional Measures/Assessment    Pt continues to demonstrate limited left shoulder elevation. Patient will continue to benefit from skilled PT / OT services to modify and progress therapeutic interventions, analyze and address functional mobility deficits, analyze and address ROM deficits, analyze and address strength deficits, analyze and address soft tissue restrictions, and analyze and cue for proper movement patterns to address functional deficits and attain remaining goals. Progress toward goals / Updated goals:  []  See Progress Note/Recertification    The patient will improve AROM flexion to 150 degrees to improve ease of reaching into overhead cabinets. PN: 140 degrees  The patient will improve functional IR to L5 to improve ease of ADLs. PN: Glute level  3. The patient will improve shoulder flexion strength to 4/5 MMT to improve ease of ADLs. PN: NT  4. The patient will improve FOTO score to predicted to 61 to improved quality of life.                PN: 62    PLAN  Yes  Continue plan of care  []  Upgrade activities as tolerated  []  Discharge due to :  []  Other:    Celso Hernandez PTA    4/5/2023    11:13 AM    Future Appointments   Date Time Provider Aden Hemphill   4/10/2023  1:30 PM Lincoln Yates PTA [facility]   4/12/2023  1:00 PM Celso Hernandez PTA [facility]   4/18/2023  1:50 PM Yina Pennington PT [facility]   4/20/2023  1:10 PM XOCHILT David

## 2023-04-18 ENCOUNTER — HOSPITAL ENCOUNTER (OUTPATIENT)
Facility: HOSPITAL | Age: 63
Setting detail: RECURRING SERIES
End: 2023-04-18
Payer: COMMERCIAL

## 2023-05-02 ENCOUNTER — HOSPITAL ENCOUNTER (OUTPATIENT)
Facility: HOSPITAL | Age: 63
Setting detail: RECURRING SERIES
Discharge: HOME | End: 2023-05-05
Payer: COMMERCIAL

## 2023-05-02 PROCEDURE — 97112 NEUROMUSCULAR REEDUCATION: CPT

## 2023-05-02 PROCEDURE — 97110 THERAPEUTIC EXERCISES: CPT

## 2023-05-02 PROCEDURE — 97140 MANUAL THERAPY 1/> REGIONS: CPT

## 2023-05-02 NOTE — PROGRESS NOTES
[address] [facility] PHYSICAL THERAPY  [address] [place], [address] - Ph: [phone]   Fx: [phone]    PHYSICAL THERAPY PROGRESS NOTE      Patient name: Gold Charlton Start of Care: 2023   Referral source: Allison Perez MD : 1960               Medical Diagnosis: Pain in left shoulder [M25.512]  Payor:  / Plan: [employer] Li 74 / Product Type: [employer] [place] /  Onset Date: DOS 2022               Treatment Diagnosis: left shoulder pain   Prior Hospitalization: see medical history Provider#: [id number]   Medications: Verified on Patient summary List    Comorbidities: DM, HTN, spinal cord stimulator may 2022   Prior Level of Function: Independent with ADLs, functional, and work activities with pain prior to the surgery. Visits from Start of Care: 22    Missed Visits: 1    Goals/Measure of Progress: To be achieved in 1 weeks:  Progress toward goals / Updated goals:  []  See Progress Note/Recertification  The patient will improve AROM flexion to 150 degrees to improve ease of reaching into overhead cabinets. PN: 140 degrees  Current: Met 4/10/23 Left AROM flexion 157 deg  The patient will improve functional IR to L5 to improve ease of ADLs. PN: Glute level  Current: sacrum. 2023  3. The patient will improve shoulder flexion strength to 4/5 MMT to improve ease of ADLs. PN: NT  Current: nearly met 4-/5 MMT  4. The patient will improve FOTO score to predicted to 61 to improved quality of life. PN: 58               Current: Met 72    Additional goal:    5. The patient will demonstrate independence and compliance with updated HEP for self management upon D.C.  PN: not yet issued    Summary of Care/ Key Functional Changes: The patient has made excellent progress with PT to date. The patient has met his FOTO score and progressed significant with AROM flexion. He will benefit from skilled PT for 1 additional visit.
Consent: The patient's consent was obtained including but not limited to risks of crusting, scabbing, blistering, scarring, darker or lighter pigmentary change, recurrence, incomplete removal and infection.
PHYSICAL / OCCUPATIONAL THERAPY - DAILY TREATMENT NOTE (updated )    Patient Name: Candy Medina    Date: 2023    : 1960  Insurance: Payor: Mitul June / Plan: [address] [place] [address] / Product Type: *No Product type* /      Patient  verified Yes     Visit #   Current / Total 22 24   Time   In / Out 11:46 12:30   Pain   In / Out 0/10 0/10   Subjective Functional Status/Changes: The patient states he is using the arm more and is pleased with his progress. Changes to:  Meds, Allergies, Med Hx, Sx Hx? If yes, update Summary List no       TREATMENT AREA =  Pain in left shoulder [M25.512]    OBJECTIVE    Therapeutic Procedures: Tx Min Billable or 1:1 Min (if diff from Tx Min) Procedure, Rationale, Specifics   24 24 07816 Therapeutic Exercise (timed):  increase ROM, strength, coordination, balance, and proprioception to improve patient's ability to progress to PLOF and address remaining functional goals. (see flow sheet as applicable)     Details if applicable:       12  43917 Neuromuscular Re-Education (timed):  improve balance, coordination, kinesthetic sense, posture, core stability and proprioception to improve patient's ability to develop conscious control of individual muscles and awareness of position of extremities in order to progress to PLOF and address remaining functional goals. (see flow sheet as applicable)     Details if applicable:     8  10829 Manual Therapy (timed):  decrease pain, increase ROM, and increase tissue extensibility to improve patient's ability to progress to PLOF and address remaining functional goals. The manual therapy interventions were performed at a separate and distinct time from the therapeutic activities interventions . (see flow sheet as applicable)     Details if applicable:   left GHJ inferior/posterior capsular mobs grade III with the patient in supine into flexion/ABD/cross body.     40 [address] [facility] [address] Reminder: bill using total billable min of TIMED
Number Of Freeze-Thaw Cycles: 1 freeze-thaw cycle
Detail Level: Simple
Include Z78.9 (Other Specified Conditions Influencing Health Status) As An Associated Diagnosis?: No
Medical Necessity Clause: This procedure was medically necessary because the lesions that were treated were:
Post-Care Instructions: I reviewed with the patient in detail post-care instructions. Patient is to wear sunprotection, and avoid picking at any of the treated lesions. Pt may apply Vaseline to crusted or scabbing areas.
Duration Of Freeze Thaw-Cycle (Seconds): 5
Number Of Freeze-Thaw Cycles: 2 freeze-thaw cycles
Medical Necessity Information: It is in your best interest to select a reason for this procedure from the list below. All of these items fulfill various CMS LCD requirements except the new and changing color options.
Detail Level: Detailed

## 2023-05-05 ENCOUNTER — HOSPITAL ENCOUNTER (OUTPATIENT)
Facility: HOSPITAL | Age: 63
Setting detail: RECURRING SERIES
Discharge: HOME | End: 2023-05-08
Payer: COMMERCIAL

## 2023-05-05 PROCEDURE — 97140 MANUAL THERAPY 1/> REGIONS: CPT

## 2023-05-05 PROCEDURE — 97110 THERAPEUTIC EXERCISES: CPT

## 2023-05-05 PROCEDURE — 97112 NEUROMUSCULAR REEDUCATION: CPT

## (undated) DEVICE — DRAPE,SHOULDER,ORTHOMAX,W/POUCH,5/CS: Brand: MEDLINE

## (undated) DEVICE — PACK PROCEDURE SURG LAMINECTOMY SPINE CUST

## (undated) DEVICE — 5.5 MM ACROMIONIZER STRAIGHT                                    BURRS, POWER/EP-1, BROWN, 8000                                    MAXIMUM RPM, PACKAGED 6 PER BOX, STERILE

## (undated) DEVICE — 3M™ IOBAN™ 2 ANTIMICROBIAL INCISE DRAPE 6650EZ: Brand: IOBAN™ 2

## (undated) DEVICE — TUBE IRRIG L8IN LNG PT W/ CONN FOR PMP SYS REDEUCE

## (undated) DEVICE — GLOVE SURG SZ 65 THK91MIL LTX FREE SYN POLYISOPRENE

## (undated) DEVICE — SOLUTION SURG PREP 26 CC PURPREP

## (undated) DEVICE — SUPER TURBOVAC 90 INTEGRATED CABLE WAND ICW: Brand: COBLATION

## (undated) DEVICE — WILSON FRAME STYLE POSITIONING KITS - 	FRAME PAD SLEEVES (SET OF 2) , DRAPE PROTECTOR, BAR PROTECTOR 7" COMFORT FOAM HEADREST, LAMINECTOMY ARM CRADLES: Brand: SOULE MEDICAL

## (undated) DEVICE — GLOVE SURG SZ 65 L12IN FNGR THK79MIL GRN LTX FREE

## (undated) DEVICE — PREP SKN CHLRAPRP APL 26ML STR --

## (undated) DEVICE — POWDER HEMOSTAT GEL 1GR --

## (undated) DEVICE — SOL INJ L R 1000ML BG --

## (undated) DEVICE — SUTURE VCRL + SZ 2-0 L18IN ABSRB VLT CT-2 1/2 CIR TAPERCUT VCP726D

## (undated) DEVICE — SUT ETHLN 3-0 18IN PS2 BLK --

## (undated) DEVICE — CATH IV AUTOGRD ORN 14GA 45MM -- INSYTE-N

## (undated) DEVICE — HANDPIECE SET WITH HIGH FLOW TIP AND SUCTION TUBE: Brand: INTERPULSE

## (undated) DEVICE — GLOVE SURG SZ 9 THK91MIL LTX FREE SYN POLYISOPRENE ANTI

## (undated) DEVICE — GLOVE SURG SZ 7 L12IN FNGR THK79MIL GRN LTX FREE

## (undated) DEVICE — FIRSTPASS ST SUTURE PASSER, SELF CAPTURE: Brand: FIRSTPASS

## (undated) DEVICE — TOOL TUNNELER STRAW LNG 35CM -- SC-4254

## (undated) DEVICE — SUTURE STRATAFIX SZ 3-0 L30CM NONABSORBABLE UD L19MM FS-2 SXMP2B408

## (undated) DEVICE — GARMENT,MEDLINE,DVT,INT,CALF,MED, GEN2: Brand: MEDLINE

## (undated) DEVICE — STERILE POLYISOPRENE POWDER-FREE SURGICAL GLOVES: Brand: PROTEXIS

## (undated) DEVICE — DERMABOND SKIN ADH 0.7ML --

## (undated) DEVICE — SOL IRRIGATION INJ NACL 0.9% 500ML BTL

## (undated) DEVICE — GLOVE SURG SZ 65 CRM LTX FREE POLYISOPRENE POLYMER BEAD ANTI

## (undated) DEVICE — SUT ETHBND 1 30IN OS4 GRN --

## (undated) DEVICE — KIT REMOTE CONTROL FREELINK ALPHA

## (undated) DEVICE — PROGRAMMER CHRGR PRECISION 3 -- SC-6412-3

## (undated) DEVICE — SOLUTION IRRIG 3000ML LAC R FLX CONT

## (undated) DEVICE — 4.5 MM INCISOR PLUS STRAIGHT                                    BLADES, POWER/EP-1, VIOLET, PACKAGED                                    6 PER BOX, STERILE

## (undated) DEVICE — PACK PROCEDURE SURG SHLDR ORTHOPEDIC CUST

## (undated) DEVICE — OPTIFOAM GENTLE LITE, BORDERED, 4X4: Brand: MEDLINE

## (undated) DEVICE — CANISTER SUCTION HI FLO 30000CC FLUID MGMT SYS TRUCLEAR DISP

## (undated) DEVICE — SUTURE VCRL + SZ 1 L18IN ABSRB UD L36MM CT-1 1/2 CIR VCP841D

## (undated) DEVICE — ULTRATAPE 2MM BLUE 38 PKG OF 6

## (undated) DEVICE — SHOULDER SUSPENSION KIT 6 PER BOX

## (undated) DEVICE — 3M™ STERI-DRAPE™ INCISE DRAPE 1050 (60CM X 45CM): Brand: STERI-DRAPE™

## (undated) DEVICE — ELEVATOR NEUROSTIMULATOR SPNL PASS FOR SPNL CRD STIM SYS